# Patient Record
Sex: MALE | Race: WHITE | NOT HISPANIC OR LATINO | Employment: FULL TIME | URBAN - METROPOLITAN AREA
[De-identification: names, ages, dates, MRNs, and addresses within clinical notes are randomized per-mention and may not be internally consistent; named-entity substitution may affect disease eponyms.]

---

## 2017-06-26 ENCOUNTER — DOCUMENTATION ONLY (OUTPATIENT)
Dept: CARDIOLOGY | Facility: CLINIC | Age: 57
End: 2017-06-26

## 2017-08-09 ENCOUNTER — PRE VISIT (OUTPATIENT)
Dept: CARDIOLOGY | Facility: CLINIC | Age: 57
End: 2017-08-09

## 2017-08-15 ENCOUNTER — HOSPITAL ENCOUNTER (OUTPATIENT)
Dept: CARDIOLOGY | Facility: CLINIC | Age: 57
Discharge: HOME | End: 2017-08-15
Attending: INTERNAL MEDICINE | Admitting: INTERNAL MEDICINE
Payer: COMMERCIAL

## 2017-08-15 DIAGNOSIS — Q25.43 AORTIC ROOT ANEURYSM: ICD-10-CM

## 2017-08-15 DIAGNOSIS — I35.1 NONRHEUMATIC AORTIC VALVE INSUFFICIENCY: ICD-10-CM

## 2017-08-15 PROCEDURE — 93306 TTE W/DOPPLER COMPLETE: CPT | Mod: 26 | Performed by: INTERNAL MEDICINE

## 2017-08-15 PROCEDURE — 93306 TTE W/DOPPLER COMPLETE: CPT

## 2017-08-18 ENCOUNTER — OFFICE VISIT (OUTPATIENT)
Dept: CARDIOLOGY | Facility: CLINIC | Age: 57
End: 2017-08-18
Attending: INTERNAL MEDICINE
Payer: COMMERCIAL

## 2017-08-18 VITALS
WEIGHT: 150.3 LBS | HEIGHT: 67 IN | HEART RATE: 75 BPM | DIASTOLIC BLOOD PRESSURE: 78 MMHG | SYSTOLIC BLOOD PRESSURE: 126 MMHG | BODY MASS INDEX: 23.59 KG/M2

## 2017-08-18 DIAGNOSIS — Q25.43 AORTIC ROOT ANEURYSM: ICD-10-CM

## 2017-08-18 DIAGNOSIS — I35.1 NONRHEUMATIC AORTIC VALVE INSUFFICIENCY: ICD-10-CM

## 2017-08-18 PROCEDURE — 99214 OFFICE O/P EST MOD 30 MIN: CPT | Performed by: INTERNAL MEDICINE

## 2017-08-18 RX ORDER — LISINOPRIL 10 MG/1
10 TABLET ORAL DAILY
Qty: 90 TABLET | Refills: 3 | Status: SHIPPED | OUTPATIENT
Start: 2017-08-18 | End: 2018-10-15

## 2017-08-18 NOTE — LETTER
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  The patient is a 56-year-old male with a history of aortic root dilatation and mild to moderate aortic insufficiency.  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  We have been monitoring his echocardiograms annually.  His latest echocardiogram showed no change in the sinuses at 4.7 cm.  A CT scan in 2013 was performed and showed an aortic root of 4.6 cm when the echo measured it at 4.9 cm.  He has a well-preserved left ventricular size and function.  He was initially treated with atenolol since 1995.  We did an extensive literature search, which did not support the continued use of beta blockers to prevent worsening aortic insufficiency or aortic root aneurysm.  Instead we changed him to lisinopril.  The patient's echocardiogram on 08/15/2017 demonstrated mild aortic insufficiency, an ascending aorta of 3.7 cm and ejection fraction of 60%-65%.  The patient continues to exercise by riding his bike and jogging nearly every day.  He denies angina or shortness of breath.  He remains on a low-salt diet.      PHYSICAL EXAMINATION:  As listed below.     Outpatient Encounter Prescriptions as of 8/18/2017   Medication Sig Dispense Refill     lisinopril (PRINIVIL/ZESTRIL) 10 MG tablet Take 1 tablet (10 mg) by mouth daily 90 tablet 3     aspirin 81 MG tablet Take 81 mg by mouth daily       potassium citrate (UROCIT-K) 10 MEQ (1080 MG) SR tablet Take 10 mEq by mouth 3 times daily (with meals)       Multiple Vitamins-Minerals (MULTI COMPLETE PO) Take by mouth daily       [DISCONTINUED] lisinopril (PRINIVIL,ZESTRIL) 10 MG tablet Take 1 tablet (10 mg) by mouth daily 90 tablet 3      No facility-administered encounter medications on file as of 8/18/2017.         ASSESSMENT:   1.  Mykel Fishman is a delightful 56-year-old male with evidence of mild to moderate aortic insufficiency and aortic root and sinuses of Valsalva dilatation.  Etiology of the aortic root dilatation is not clear, but it certainly may be secondary to the aortic insufficiency or vice versa.  I cannot rule out fibromuscular dysplasia, although the patient does not have significant vascular disease otherwise.  We continue to treat with lisinopril and monitor his blood pressure and aorta.  He is a nonsmoker and he has an excellent exercise program.  We would consider surgery for aortic root dilatation when it reaches 5-5.5 cm.   2.  I have asked the patient to continue with his aerobic exercise and avoid heavy lifting.      PLAN:   1.  We will recheck an echocardiogram in 1 year and I will see him at that time.   2.  The patient will call for any chest discomfort or significant shortness of breath.      It is my pleasure to assist in the care of Mykel Fishman.  All his questions were answered to his satisfaction.      Sincerely,    Michael Whittaker MD     [facility]

## 2017-08-18 NOTE — PROGRESS NOTES
HISTORY OF PRESENT ILLNESS:  I again had the pleasure of seeing your patient, Mykel Fishman, at 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  The patient is a 56-year-old male with a history of aortic root dilatation and mild to moderate aortic insufficiency.  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  We have been monitoring his echocardiograms annually.  His latest echocardiogram showed no change in the sinuses at 4.7 cm.  A CT scan in 2013 was performed and showed an aortic root of 4.6 cm when the echo measured it at 4.9 cm.  He has a well-preserved left ventricular size and function.  He was initially treated with atenolol since 1995.  We did an extensive literature search, which did not support the continued use of beta blockers to prevent worsening aortic insufficiency or aortic root aneurysm.  Instead we changed him to lisinopril.  The patient's echocardiogram on 08/15/2017 demonstrated mild aortic insufficiency, an ascending aorta of 3.7 cm and ejection fraction of 60%-65%.  The patient continues to exercise by riding his bike and jogging nearly every day.  He denies angina or shortness of breath.  He remains on a low-salt diet.      PHYSICAL EXAMINATION:  As listed below.      ASSESSMENT:   1.  Mykel Fishman is a delightful 56-year-old male with evidence of mild to moderate aortic insufficiency and aortic root and sinuses of Valsalva dilatation.  Etiology of the aortic root dilatation is not clear, but it certainly may be secondary to the aortic insufficiency or vice versa.  I cannot rule out fibromuscular dysplasia, although the patient does not have significant vascular disease otherwise.  We continue to treat with lisinopril and monitor his blood pressure and aorta.  He is a nonsmoker and he has an excellent exercise program.  We would consider surgery for aortic root dilatation when it reaches 5-5.5 cm.   2.   I have asked the patient to continue with his aerobic exercise and avoid heavy lifting.      PLAN:   1.  We will recheck an echocardiogram in 1 year and I will see him at that time.   2.  The patient will call for any chest discomfort or significant shortness of breath.      It is my pleasure to assist in the care of Juancho Fishman.  All his questions were answered to his satisfaction.      cc:   Otto Ross MD   [facility]   [address] [place] [address]  [address]         JOSE ISSA MD, [facility]             D: 2017 12:28   T: 2017 12:53   MT: julien      Name:     JUANCHO FISHMAN   MRN:      [id number]        Account:      [id number]   :      1960           Service Date: 2017      Document: [id number]

## 2017-08-18 NOTE — MR AVS SNAPSHOT
After Visit Summary   8/18/2017    Mykel Fishman    MRN: [id number]           Patient Information     Date Of Birth          1960        Visit Information        Provider Department      8/18/2017 11:45 AM Michael Whittaker MD [facility]        Today's Diagnoses     Aortic root aneurysm (H)        Nonrheumatic aortic valve insufficiency           Follow-ups after your visit        Additional Services     Follow-Up with Cardiologist                 Future tests that were ordered for you today     Open Future Orders        Priority Expected Expires Ordered    Follow-Up with Cardiologist Routine 8/18/2018 8/19/2018 8/18/2017    Echocardiogram Routine 8/18/2018 8/19/2018 8/18/2017            Who to contact     If you have questions or need follow up information about today's clinic visit or your schedule please contact [facility] directly at [phone].  Normal or non-critical lab and imaging results will be communicated to you by [employer], letter or phone within 4 business days after the clinic has received the results. If you do not hear from us within 7 days, please contact the clinic through [employer] or phone. If you have a critical or abnormal lab result, we will notify you by phone as soon as possible.  Submit refill requests through [employer] or call your pharmacy and they will forward the refill request to us. Please allow 3 business days for your refill to be completed.          Additional Information About Your Visit        MyChart Information     [employer] gives you secure access to your electronic health record. If you see a primary care provider, you can also send messages to your care team and make appointments. If you have questions, please call your primary care clinic.  If you do not have a primary care provider, please call [phone] and they will assist you.        Care EveryWhere ID     This is  "your Care EveryWhere ID. This could be used by other organizations to access your [facility] medical records  [id number]        Your Vitals Were     Pulse Height BMI (Body Mass Index)             75 1.702 m (5' 7\") 23.54 kg/m2          Blood Pressure from Last 3 Encounters:   08/18/17 126/78   07/30/16 138/80   04/07/16 122/70    Weight from Last 3 Encounters:   08/18/17 68.2 kg (150 lb 4.8 oz)   07/30/16 69.9 kg (154 lb)   04/07/16 69.4 kg (153 lb)              We Performed the Following     Follow-Up with Cardiologist          Where to get your medicines      These medications were sent to [facility] [id number] [place], [place] [address] AT [facility] [place]  [address], [facility] [address]     Phone:  [phone]     lisinopril 10 MG tablet          Primary Care Provider Office Phone # Fax #    Otto Ross [phone]       [address] [facility] [address]        Equal Access to Services     CB BROCK AH: Hadii jesus vivas hadasho Soomaali, waaxda luqadaha, qaybta kaalmada adeegyada, magda amaya. So [facility] [phone].    ATENCIÓN: Si habla español, tiene a barclay disposición servicios gratuitos de asistencia lingüística. Llame al [phone].    We comply with applicable federal civil rights laws and [place] laws. We do not discriminate on the basis of race, color, national origin, age, disability sex, sexual orientation or gender identity.            Thank you!     Thank you for choosing [facility]  for your care. Our goal is always to provide you with excellent care. Hearing back from our patients is one way we can continue to improve our services. Please take a few minutes to complete the written survey that you may receive in the mail after your visit with us. Thank you!             Your Updated Medication List - Protect others around you: " Learn how to safely use, store and throw away your medicines at www.disposemymeds.org.          This list is accurate as of: 8/18/17 12:19 PM.  Always use your most recent med list.                   Brand Name Dispense Instructions for use Diagnosis    aspirin 81 MG tablet      Take 81 mg by mouth daily        lisinopril 10 MG tablet    PRINIVIL/ZESTRIL    90 tablet    Take 1 tablet (10 mg) by mouth daily    Nonrheumatic aortic valve insufficiency, Aortic root aneurysm (H)       MULTI COMPLETE PO      Take by mouth daily        potassium citrate 10 MEQ (1080 MG) CR tablet    UROCIT-K     Take 10 mEq by mouth 3 times daily (with meals)

## 2017-08-18 NOTE — PROGRESS NOTES
HPI and Plan:   See dictation:[id number]    Orders Placed This Encounter   Procedures     Follow-Up with Cardiologist     Echocardiogram       Orders Placed This Encounter   Medications     lisinopril (PRINIVIL/ZESTRIL) 10 MG tablet     Sig: Take 1 tablet (10 mg) by mouth daily     Dispense:  90 tablet     Refill:  3       Medications Discontinued During This Encounter   Medication Reason     lisinopril (PRINIVIL,ZESTRIL) 10 MG tablet Reorder         Encounter Diagnoses   Name Primary?     Aortic root aneurysm (H)      Nonrheumatic aortic valve insufficiency        CURRENT MEDICATIONS:  Current Outpatient Prescriptions   Medication Sig Dispense Refill     lisinopril (PRINIVIL/ZESTRIL) 10 MG tablet Take 1 tablet (10 mg) by mouth daily 90 tablet 3     aspirin 81 MG tablet Take 81 mg by mouth daily       potassium citrate (UROCIT-K) 10 MEQ (1080 MG) SR tablet Take 10 mEq by mouth 3 times daily (with meals)       Multiple Vitamins-Minerals (MULTI COMPLETE PO) Take by mouth daily       [DISCONTINUED] lisinopril (PRINIVIL,ZESTRIL) 10 MG tablet Take 1 tablet (10 mg) by mouth daily 90 tablet 3       ALLERGIES     Allergies   Allergen Reactions     No Clinical Screening - See Comments      NOVACAINE AND LOCAL ANSETHETICS     Penicillins        PAST MEDICAL HISTORY:  Past Medical History:   Diagnosis Date     Aortic regurgitation     +1-2 AR noted on 7/7/2014 Echo     Aortic root aneurysm (H)     4.3 cm noted on 7/7/2014 Echo     Elevated blood pressure      Family history of sudden death     Brother age 41     Kidney stones        PAST SURGICAL HISTORY:  History reviewed. No pertinent surgical history.    FAMILY HISTORY:  Family History   Problem Relation Age of Onset     Coronary Artery Disease Mother      Hypertension Mother      Heart Murmur Father      Coronary Artery Disease Brother        SOCIAL HISTORY:  Social History     Social History     Marital status:      Spouse name: N/A     Number of children: N/A      "Years of education: N/A     Social History Main Topics     Smoking status: Former Smoker     Smokeless tobacco: Never Used      Comment: quit in 2005      Alcohol use Yes      Comment: occasional     Drug use: No     Sexual activity: Not Asked     Other Topics Concern     Caffeine Concern No     2 cups coffee     Exercise Yes     daily, videos     Seat Belt Yes     Social History Narrative       Review of Systems:  Skin:  Negative       Eyes:  Positive for glasses    ENT:  Negative      Respiratory:  Negative       Cardiovascular:  Negative      Gastroenterology: Negative      Genitourinary:  not assessed      Musculoskeletal:  Negative      Neurologic:  Negative      Psychiatric:  Negative      Heme/Lymph/Imm:  Positive for allergies    Endocrine:  Negative        Physical Exam:  Vitals: /78  Pulse 75  Ht 1.702 m (5' 7\")  Wt 68.2 kg (150 lb 4.8 oz)  BMI 23.54 kg/m2    Constitutional:  cooperative, alert and oriented, well developed, well nourished, in no acute distress        Skin:  warm and dry to the touch, no apparent skin lesions or masses noted        Head:  normocephalic, no masses or lesions        Eyes:  pupils equal and round, conjunctivae and lids unremarkable, sclera white, no xanthalasma, EOMS intact, no nystagmus        ENT:  no pallor or cyanosis, dentition good        Neck:  carotid pulses are full and equal bilaterally, JVP normal, no carotid bruit, no thyromegaly        Chest:  normal breath sounds, clear to auscultation, normal A-P diameter, normal symmetry, normal respiratory excursion, no use of accessory muscles          Cardiac: regular rhythm;normal S1 and S2;no S3 or S4;apical impulse not displaced           early diastolic murmur;grade 1;RUSB      Abdomen:  abdomen soft, non-tender, BS normoactive, no mass, no HSM, no bruits        Vascular: pulses full and equal, no bruits auscultated                                        Extremities and Back:  no deformities, clubbing, " cyanosis, erythema observed;no edema              Neurological:  affect appropriate, oriented to time, person and place;no gross motor deficits              CC  Michael Whittaker MD  [address]  [place] [address]

## 2018-09-25 DIAGNOSIS — I35.1 AORTIC REGURGITATION: Primary | ICD-10-CM

## 2018-09-25 NOTE — PROGRESS NOTES
Call from patient stating that he missed his echo in August and needs to reschedule prior to his OV with Dr. Whittaker in October, but the order is . Echocardiogram order for patient is . New order placed. Contacted scheduling to have them set patient up for echocardiogram prior to OV with Dr. Whittaker.

## 2018-10-02 ENCOUNTER — HOSPITAL ENCOUNTER (OUTPATIENT)
Dept: CARDIOLOGY | Facility: CLINIC | Age: 58
End: 2018-10-02
Attending: INTERNAL MEDICINE
Payer: COMMERCIAL

## 2018-10-02 DIAGNOSIS — I35.1 AORTIC REGURGITATION: ICD-10-CM

## 2018-10-02 PROCEDURE — 93306 TTE W/DOPPLER COMPLETE: CPT | Mod: 26 | Performed by: INTERNAL MEDICINE

## 2018-10-15 ENCOUNTER — OFFICE VISIT (OUTPATIENT)
Dept: CARDIOLOGY | Facility: CLINIC | Age: 58
End: 2018-10-15
Payer: COMMERCIAL

## 2018-10-15 VITALS
HEART RATE: 80 BPM | WEIGHT: 162 LBS | SYSTOLIC BLOOD PRESSURE: 132 MMHG | DIASTOLIC BLOOD PRESSURE: 78 MMHG | BODY MASS INDEX: 25.43 KG/M2 | HEIGHT: 67 IN

## 2018-10-15 DIAGNOSIS — K51.00 ULCERATIVE PANCOLITIS WITHOUT COMPLICATION (H): ICD-10-CM

## 2018-10-15 DIAGNOSIS — Q25.43 AORTIC ROOT ANEURYSM: Primary | ICD-10-CM

## 2018-10-15 DIAGNOSIS — I35.1 NONRHEUMATIC AORTIC VALVE INSUFFICIENCY: ICD-10-CM

## 2018-10-15 PROCEDURE — 99214 OFFICE O/P EST MOD 30 MIN: CPT | Performed by: INTERNAL MEDICINE

## 2018-10-15 RX ORDER — ATENOLOL 25 MG/1
50 TABLET ORAL DAILY
Qty: 90 TABLET | Refills: 3 | Status: SHIPPED | OUTPATIENT
Start: 2018-10-15 | End: 2018-10-17

## 2018-10-15 RX ORDER — MESALAMINE 800 MG/1
800 TABLET, DELAYED RELEASE ORAL 3 TIMES DAILY
Qty: 270 TABLET | Refills: 3 | COMMUNITY
Start: 2018-10-15 | End: 2021-02-02

## 2018-10-15 NOTE — LETTER
10/15/2018      Otto Ross MD  [email or web address]   [address]  [facility] [address]      RE: Mykel Fishman       Dear Colleague,    I had the pleasure of seeing Mykel Fishman in the [facility].    Service Date: 10/15/2018      PRIMARY CARE PHYSICIAN:  Dr. 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  The patient is a 58-year-old male with a history of aortic root dilatation and mild to moderate aortic insufficiency.  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  We have been monitoring his echocardiograms annually.  An echocardiogram performed on 10/02 and compared to a previous echocardiogram of 08/15/2017 continued to demonstrate an aortic root of 4.7 cm and an ascending aorta of 3.6 cm.  The degree of aortic insufficiency has been trace to mild.  Left ventricular ejection fraction is well preserved and there is no left ventricular enlargement.  Initially the patient was treated with atenolol since 1995.  Extensive literature search previously did not indicate beta blockers were preferred and he was changed lisinopril.  More recently literature has again noted beta blockers should be used for aortic aneurysms and I have changed him back to atenolol 50 mg per day.  The patient continues to exercise by riding his bike and walking 3 times a week for each for 1 hour.  He denies angina or shortness of breath.  He remains on a low-salt diet.  He has developed ulcerative colitis.  He denies hospitalizations or surgery in the last year.  He does some video aerobics as well.      PHYSICAL EXAMINATION:  As listed below.             ASSESSMENT:   1.  Mykel Fishman is a delightful 58-year-old male with evidence of mild aortic insufficiency and aortic root and sinuses of  Valsalva dilatation.  Etiology of the aortic root dilatation is not clear but certainly may be secondary to the aortic insufficiency or vice versa.  I cannot rule out fibromuscular dysplasia although the patient does not have significant vascular disease otherwise.  We have changed his lisinopril back to a beta blocker.  He is a nonsmoker and has an excellent exercise program.  We would consider surgery for aortic root dilatation when it reaches 5 to 5.5 cm.   2.  I have asked the patient to continue with his aerobic exercise and avoid heavy lifting.   3.  I note that this patient has had his cholesterol done every year without change.  His lipid levels have remained constant.  The current recommendation is for lipids to be performed every 5 years if the patient does not have coronary artery disease and is not placed on a statin.      PLAN:   1.  We will recheck an echocardiogram in 1 year and I will see him at that time.   2.  I have given him a new prescription for atenolol 50 mg daily.  We will discontinue the lisinopril.   3.  The patient will call for any chest discomfort or significant shortness of breath.      It is my pleasure to assist in the care of Juancho Rios.  All his questions were answered to his satisfaction.      Jose Issa MD      cc:   Otto Ross MD    [place] [phone]         JOSE ISSA MD, [facility]             D: 10/15/2018   T: 10/15/2018   MT: JULIAN      Name:     JUANCHO RIOS   MRN:      [id number]        Account:      [id number]   :      1960           Service Date: 10/15/2018      Document: [id number]           Outpatient Encounter Prescriptions as of 10/15/2018   Medication Sig Dispense Refill     mesalamine (ASACOL HD) 800 MG EC tablet Take 1 tablet (800 mg) by mouth 3 times daily 270 tablet 3     Multiple Vitamins-Minerals (MULTI COMPLETE PO) Take by mouth daily       potassium citrate  (UROCIT-K) 10 MEQ (1080 MG) SR tablet Take 10 mEq by mouth 2 times daily        [DISCONTINUED] atenolol (TENORMIN) 25 MG tablet Take 2 tablets (50 mg) by mouth daily 90 tablet 3     [DISCONTINUED] aspirin 81 MG tablet Take 81 mg by mouth daily       [DISCONTINUED] lisinopril (PRINIVIL/ZESTRIL) 10 MG tablet Take 1 tablet (10 mg) by mouth daily 90 tablet 3     No facility-administered encounter medications on file as of 10/15/2018.        Again, thank you for allowing me to participate in the care of your patient.      Sincerely,    Michael Whittaker MD     [facility]

## 2018-10-15 NOTE — MR AVS SNAPSHOT
After Visit Summary   10/15/2018    Mykel Fishman    MRN: [id number]           Patient Information     Date Of Birth          1960        Visit Information        Provider Department      10/15/2018 3:15 PM Michael Whittaker MD [facility]        Today's Diagnoses     Aortic root aneurysm (H)    -  1    Nonrheumatic aortic valve insufficiency        Ulcerative pancolitis without complication (H)           Follow-ups after your visit        Additional Services     Follow-Up with Cardiologist                 Future tests that were ordered for you today     Open Future Orders        Priority Expected Expires Ordered    Follow-Up with Cardiologist Routine 10/15/2019 10/16/2019 10/15/2018    Echocardiogram Routine 10/15/2019 10/16/2019 10/15/2018            Who to contact     If you have questions or need follow up information about today's clinic visit or your schedule please contact [facility] directly at [phone].  Normal or non-critical lab and imaging results will be communicated to you by [email or web address], letter or phone within 4 business days after the clinic has received the results. If you do not hear from us within 7 days, please contact the clinic through [employer] or phone. If you have a critical or abnormal lab result, we will notify you by phone as soon as possible.  Submit refill requests through [employer] or call your pharmacy and they will forward the refill request to us. Please allow 3 business days for your refill to be completed.          Additional Information About Your Visit        MyChart Information     [employer] gives you secure access to your electronic health record. If you see a primary care provider, you can also send messages to your care team and make appointments. If you have questions, please call your primary care clinic.  If you do not have a primary care provider, please call [phone] and they  "will assist you.        Care EveryWhere ID     This is your Care EveryWhere ID. This could be used by other organizations to access your [facility] [place] medical records  [id number]        Your Vitals Were     Pulse Height BMI (Body Mass Index)             80 1.702 m (5' 7\") 25.37 kg/m2          Blood Pressure from Last 3 Encounters:   10/15/18 132/78   08/18/17 126/78   07/30/16 138/80    Weight from Last 3 Encounters:   10/15/18 73.5 kg (162 lb)   08/18/17 68.2 kg (150 lb 4.8 oz)   07/30/16 69.9 kg (154 lb)                 Today's Medication Changes          These changes are accurate as of 10/15/18  3:56 PM.  If you have any questions, ask your nurse or doctor.               Start taking these medicines.        Dose/Directions    atenolol 25 MG tablet   Commonly known as:  TENORMIN   Used for:  Aortic root aneurysm (H)   Started by:  Michael Whittaker MD        Dose:  50 mg   Take 2 tablets (50 mg) by mouth daily   Quantity:  90 tablet   Refills:  3         Stop taking these medicines if you haven't already. Please contact your care team if you have questions.     aspirin 81 MG tablet   Stopped by:  Michael Whittaker MD           lisinopril 10 MG tablet   Commonly known as:  PRINIVIL/ZESTRIL   Stopped by:  Michael Whittaker MD                Where to get your medicines      These medications were sent to [facility] [id number] [place] [address] [address] AT [facility] [address]  [address], [facility] [address]     Phone:  [phone]     atenolol 25 MG tablet                Primary Care Provider Office Phone # Fax #    Otto Ross [phone]       [employer] [address] [facility] [address]        Equal Access to Services     [facility] VINOD : Casey arriaga Soryley, waaxda luqadaha, qaybta kaalmada adeegyagin, magda medina . [facility] [phone].    ATENCIÓN: Si christian lu, tiene a barclay disposición " servicios gratuitos de asistencia lingüística. Merlene denney [phone].    We comply with applicable federal civil rights laws and [place] laws. We do not discriminate on the basis of race, color, national origin, age, disability, sex, sexual orientation, or gender identity.            Thank you!     Thank you for choosing [facility]  for your care. Our goal is always to provide you with excellent care. Hearing back from our patients is one way we can continue to improve our services. Please take a few minutes to complete the written survey that you may receive in the mail after your visit with us. Thank you!             Your Updated Medication List - Protect others around you: Learn how to safely use, store and throw away your medicines at www.disposemymeds.org.          This list is accurate as of 10/15/18  3:56 PM.  Always use your most recent med list.                   Brand Name Dispense Instructions for use Diagnosis    atenolol 25 MG tablet    TENORMIN    90 tablet    Take 2 tablets (50 mg) by mouth daily    Aortic root aneurysm (H)       mesalamine 800 MG EC tablet    ASACOL HD    270 tablet    Take 1 tablet (800 mg) by mouth 3 times daily    Ulcerative pancolitis without complication (H)       MULTI COMPLETE PO      Take by mouth daily        potassium citrate 10 MEQ (1080 MG) CR tablet    UROCIT-K     Take 10 mEq by mouth 2 times daily

## 2018-10-15 NOTE — LETTER
10/15/2018    Otto Ross MD  [employer] [address]  [facility] [address]    RE: Mykel Fishman       Dear Colleague,    I had the pleasure of seeing Mykel Fishman in the [facility].    HPI and Plan:   See dictation:[id number]    Orders Placed This Encounter   Procedures     Follow-Up with Cardiologist     Echocardiogram       Orders Placed This Encounter   Medications     atenolol (TENORMIN) 25 MG tablet     Sig: Take 2 tablets (50 mg) by mouth daily     Dispense:  90 tablet     Refill:  3     mesalamine (ASACOL HD) 800 MG EC tablet     Sig: Take 1 tablet (800 mg) by mouth 3 times daily     Dispense:  270 tablet     Refill:  3       Medications Discontinued During This Encounter   Medication Reason     aspirin 81 MG tablet      lisinopril (PRINIVIL/ZESTRIL) 10 MG tablet          Encounter Diagnoses   Name Primary?     Aortic root aneurysm (H) Yes     Nonrheumatic aortic valve insufficiency      Ulcerative pancolitis without complication (H)        CURRENT MEDICATIONS:  Current Outpatient Prescriptions   Medication Sig Dispense Refill     atenolol (TENORMIN) 25 MG tablet Take 2 tablets (50 mg) by mouth daily 90 tablet 3     mesalamine (ASACOL HD) 800 MG EC tablet Take 1 tablet (800 mg) by mouth 3 times daily 270 tablet 3     Multiple Vitamins-Minerals (MULTI COMPLETE PO) Take by mouth daily       potassium citrate (UROCIT-K) 10 MEQ (1080 MG) SR tablet Take 10 mEq by mouth 2 times daily          ALLERGIES     Allergies   Allergen Reactions     No Clinical Screening - See Comments      NOVACAINE AND LOCAL ANSETHETICS     Penicillins        PAST MEDICAL HISTORY:  Past Medical History:   Diagnosis Date     Aortic regurgitation     +1-2 AR noted on 7/7/2014 Echo     Aortic root aneurysm (H)     4.3 cm noted on 7/7/2014 Echo     Elevated blood pressure      Family history of sudden death     Brother age 41     Kidney stones        PAST SURGICAL HISTORY:  History reviewed. No  "pertinent surgical history.    FAMILY HISTORY:  Family History   Problem Relation Age of Onset     Coronary Artery Disease Mother      Hypertension Mother      Heart Murmur Father      Coronary Artery Disease Brother        SOCIAL HISTORY:  Social History     Social History     Marital status:      Spouse name: N/A     Number of children: N/A     Years of education: N/A     Social History Main Topics     Smoking status: Former Smoker     Packs/day: 1.00     Start date: 1990     Quit date: 2005     Smokeless tobacco: Never Used      Comment: quit in 2005      Alcohol use Yes      Comment: occasional     Drug use: No     Sexual activity: Not Asked     Other Topics Concern     Caffeine Concern No     2 cups coffee     Exercise Yes     daily, videos     Seat Belt Yes     Social History Narrative       Review of Systems:  Skin:  Negative       Eyes:  Positive for glasses    ENT:  Negative      Respiratory:  Negative       Cardiovascular:  Negative      Gastroenterology: Positive for  (colitis)    Genitourinary:  not assessed      Musculoskeletal:  Negative      Neurologic:  Negative      Psychiatric:  Negative      Heme/Lymph/Imm:  Positive for allergies    Endocrine:  Negative        Physical Exam:  Vitals: /78  Pulse 80  Ht 1.702 m (5' 7\")  Wt 73.5 kg (162 lb)  BMI 25.37 kg/m2    Constitutional:  cooperative, alert and oriented, well developed, well nourished, in no acute distress        Skin:  warm and dry to the touch, no apparent skin lesions or masses noted          Head:  normocephalic, no masses or lesions        Eyes:  pupils equal and round;conjunctivae and lids unremarkable;sclera white;no xanthalasma;EOMS intact        Lymph:      ENT:  no pallor or cyanosis, dentition good        Neck:  carotid pulses are full and equal bilaterally, JVP normal, no carotid bruit        Respiratory:  normal breath sounds, clear to auscultation, normal A-P diameter, normal symmetry, normal respiratory " excursion, no use of accessory muscles         Cardiac: regular rhythm;normal S1 and S2;no S3 or S4;apical impulse not displaced           early diastolic murmur;grade 1;RUSB    pulses full and equal, no bruits auscultated                                        GI:  abdomen soft, non-tender, BS normoactive, no mass, no HSM, no bruits        Extremities and Muscular Skeletal:  no deformities, clubbing, cyanosis, erythema observed;no edema              Neurological:  no gross motor deficits;affect appropriate        Psych:  Alert and Oriented x 3        CC  No referring provider defined for this encounter.                Thank you for allowing me to participate in the care of your patient.      Sincerely,     Michael Whittaker MD     [facility]    cc:   No referring provider defined for this encounter.

## 2018-10-15 NOTE — PROGRESS NOTES
HPI and Plan:   See dictation:[id number]    Orders Placed This Encounter   Procedures     Follow-Up with Cardiologist     Echocardiogram       Orders Placed This Encounter   Medications     atenolol (TENORMIN) 25 MG tablet     Sig: Take 2 tablets (50 mg) by mouth daily     Dispense:  90 tablet     Refill:  3     mesalamine (ASACOL HD) 800 MG EC tablet     Sig: Take 1 tablet (800 mg) by mouth 3 times daily     Dispense:  270 tablet     Refill:  3       Medications Discontinued During This Encounter   Medication Reason     aspirin 81 MG tablet      lisinopril (PRINIVIL/ZESTRIL) 10 MG tablet          Encounter Diagnoses   Name Primary?     Aortic root aneurysm (H) Yes     Nonrheumatic aortic valve insufficiency      Ulcerative pancolitis without complication (H)        CURRENT MEDICATIONS:  Current Outpatient Prescriptions   Medication Sig Dispense Refill     atenolol (TENORMIN) 25 MG tablet Take 2 tablets (50 mg) by mouth daily 90 tablet 3     mesalamine (ASACOL HD) 800 MG EC tablet Take 1 tablet (800 mg) by mouth 3 times daily 270 tablet 3     Multiple Vitamins-Minerals (MULTI COMPLETE PO) Take by mouth daily       potassium citrate (UROCIT-K) 10 MEQ (1080 MG) SR tablet Take 10 mEq by mouth 2 times daily          ALLERGIES     Allergies   Allergen Reactions     No Clinical Screening - See Comments      NOVACAINE AND LOCAL ANSETHETICS     Penicillins        PAST MEDICAL HISTORY:  Past Medical History:   Diagnosis Date     Aortic regurgitation     +1-2 AR noted on 7/7/2014 Echo     Aortic root aneurysm (H)     4.3 cm noted on 7/7/2014 Echo     Elevated blood pressure      Family history of sudden death     Brother age 41     Kidney stones        PAST SURGICAL HISTORY:  History reviewed. No pertinent surgical history.    FAMILY HISTORY:  Family History   Problem Relation Age of Onset     Coronary Artery Disease Mother      Hypertension Mother      Heart Murmur Father      Coronary Artery Disease Brother        SOCIAL  "HISTORY:  Social History     Social History     Marital status:      Spouse name: N/A     Number of children: N/A     Years of education: N/A     Social History Main Topics     Smoking status: Former Smoker     Packs/day: 1.00     Start date: 1990     Quit date: 2005     Smokeless tobacco: Never Used      Comment: quit in 2005      Alcohol use Yes      Comment: occasional     Drug use: No     Sexual activity: Not Asked     Other Topics Concern     Caffeine Concern No     2 cups coffee     Exercise Yes     daily, videos     Seat Belt Yes     Social History Narrative       Review of Systems:  Skin:  Negative       Eyes:  Positive for glasses    ENT:  Negative      Respiratory:  Negative       Cardiovascular:  Negative      Gastroenterology: Positive for  (colitis)    Genitourinary:  not assessed      Musculoskeletal:  Negative      Neurologic:  Negative      Psychiatric:  Negative      Heme/Lymph/Imm:  Positive for allergies    Endocrine:  Negative        Physical Exam:  Vitals: /78  Pulse 80  Ht 1.702 m (5' 7\")  Wt 73.5 kg (162 lb)  BMI 25.37 kg/m2    Constitutional:  cooperative, alert and oriented, well developed, well nourished, in no acute distress        Skin:  warm and dry to the touch, no apparent skin lesions or masses noted          Head:  normocephalic, no masses or lesions        Eyes:  pupils equal and round;conjunctivae and lids unremarkable;sclera white;no xanthalasma;EOMS intact        Lymph:      ENT:  no pallor or cyanosis, dentition good        Neck:  carotid pulses are full and equal bilaterally, JVP normal, no carotid bruit        Respiratory:  normal breath sounds, clear to auscultation, normal A-P diameter, normal symmetry, normal respiratory excursion, no use of accessory muscles         Cardiac: regular rhythm;normal S1 and S2;no S3 or S4;apical impulse not displaced           early diastolic murmur;grade 1;RUSB    pulses full and equal, no bruits auscultated                   "                      GI:  abdomen soft, non-tender, BS normoactive, no mass, no HSM, no bruits        Extremities and Muscular Skeletal:  no deformities, clubbing, cyanosis, erythema observed;no edema              Neurological:  no gross motor deficits;affect appropriate        Psych:  Alert and Oriented x 3        CC  No referring provider defined for this encounter.

## 2018-10-16 NOTE — PROGRESS NOTES
Service Date: 10/15/2018      PRIMARY CARE PHYSICIAN:  Dr. Otto Ross        HISTORY OF PRESENT ILLNESS:  I again had the pleasure of seeing your patient, Mykel Fishman, at [facility] for evaluation of aortic root aneurysm as well as aortic insufficiency.  The patient is a 58-year-old male with a history of aortic root dilatation and mild to moderate aortic insufficiency.  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  We have been monitoring his echocardiograms annually.  An echocardiogram performed on 10/02 and compared to a previous echocardiogram of 08/15/2017 continued to demonstrate an aortic root of 4.7 cm and an ascending aorta of 3.6 cm.  The degree of aortic insufficiency has been trace to mild.  Left ventricular ejection fraction is well preserved and there is no left ventricular enlargement.  Initially the patient was treated with atenolol since 1995.  Extensive literature search previously did not indicate beta blockers were preferred and he was changed lisinopril.  More recently literature has again noted beta blockers should be used for aortic aneurysms and I have changed him back to atenolol 50 mg per day.  The patient continues to exercise by riding his bike and walking 3 times a week for each for 1 hour.  He denies angina or shortness of breath.  He remains on a low-salt diet.  He has developed ulcerative colitis.  He denies hospitalizations or surgery in the last year.  He does some video aerobics as well.      PHYSICAL EXAMINATION:  As listed below.         ASSESSMENT:   1.  Mykel Fishman is a delightful 58-year-old male with evidence of mild aortic insufficiency and aortic root and sinuses of Valsalva dilatation.  Etiology of the aortic root dilatation is not clear but certainly may be secondary to the aortic insufficiency or vice versa.  I cannot rule out fibromuscular dysplasia although the patient does not have significant vascular  disease otherwise.  We have changed his lisinopril back to a beta blocker.  He is a nonsmoker and has an excellent exercise program.  We would consider surgery for aortic root dilatation when it reaches 5 to 5.5 cm.   2.  I have asked the patient to continue with his aerobic exercise and avoid heavy lifting.   3.  I note that this patient has had his cholesterol done every year without change.  His lipid levels have remained constant.  The current recommendation is for lipids to be performed every 5 years if the patient does not have coronary artery disease and is not placed on a statin.      PLAN:   1.  We will recheck an echocardiogram in 1 year and I will see him at that time.   2.  I have given him a new prescription for atenolol 50 mg daily.  We will discontinue the lisinopril.   3.  The patient will call for any chest discomfort or significant shortness of breath.      It is my pleasure to assist in the care of Juancho Rios.  All his questions were answered to his satisfaction.      Jose Issa MD      cc:   Otto Ross MD    [place] [phone]         JOSE ISSA MD, [facility]             D: 10/15/2018   T: 10/15/2018   MT: JULIAN      Name:     JUANCHO RIOS   MRN:      [id number]        Account:      [id number]   :      1960           Service Date: 10/15/2018      Document: [id number]

## 2018-10-17 DIAGNOSIS — Q25.43 AORTIC ROOT ANEURYSM: ICD-10-CM

## 2018-10-17 RX ORDER — ATENOLOL 50 MG/1
50 TABLET ORAL DAILY
Qty: 90 TABLET | Refills: 3 | Status: SHIPPED | OUTPATIENT
Start: 2018-10-17 | End: 2019-11-25

## 2019-07-13 ENCOUNTER — OFFICE VISIT (OUTPATIENT)
Dept: URGENT CARE | Facility: URGENT CARE | Age: 59
End: 2019-07-13
Payer: COMMERCIAL

## 2019-07-13 VITALS
BODY MASS INDEX: 24.43 KG/M2 | SYSTOLIC BLOOD PRESSURE: 132 MMHG | DIASTOLIC BLOOD PRESSURE: 70 MMHG | RESPIRATION RATE: 16 BRPM | WEIGHT: 156 LBS | HEART RATE: 70 BPM | TEMPERATURE: 98.9 F

## 2019-07-13 DIAGNOSIS — B34.9 VIRAL SYNDROME: Primary | ICD-10-CM

## 2019-07-13 DIAGNOSIS — B02.9 HERPES ZOSTER WITHOUT COMPLICATION: ICD-10-CM

## 2019-07-13 PROCEDURE — 99214 OFFICE O/P EST MOD 30 MIN: CPT | Performed by: PHYSICIAN ASSISTANT

## 2019-07-13 RX ORDER — VALACYCLOVIR HYDROCHLORIDE 1 G/1
1000 TABLET, FILM COATED ORAL 3 TIMES DAILY
Qty: 21 TABLET | Refills: 0 | Status: SHIPPED | OUTPATIENT
Start: 2019-07-13 | End: 2021-02-02

## 2019-07-13 RX ORDER — SULFASALAZINE 500 MG/1
1500 TABLET, DELAYED RELEASE ORAL 2 TIMES DAILY
Refills: 3 | COMMUNITY
Start: 2019-05-02

## 2019-07-13 RX ORDER — PREDNISONE 2.5 MG/1
2.5 TABLET ORAL
Refills: 0 | COMMUNITY
Start: 2019-06-06 | End: 2022-07-03

## 2019-07-13 RX ORDER — FOLIC ACID 1 MG/1
TABLET ORAL
Refills: 10 | COMMUNITY
Start: 2019-06-21

## 2019-07-16 NOTE — PROGRESS NOTES
SUBJECTIVE:  Mykel Fishman is a 58 year old male who presents to the clinic today for a rash.  Onset of rash was 3 day(s) ago.   Rash is still present.  Location of the rash: right lower leg, foot and leg.  Quality/symptoms of rash: red   Symptoms are mild and moderate and rash seems to be stable.  Previous history of a similar rash? No  Recent exposure history: none  Recent new medications: none  Associated symptoms include: erythema, macular rash.    Past Medical History:   Diagnosis Date     Aortic regurgitation     +1-2 AR noted on 2014 Echo     Aortic root aneurysm (H)     4.3 cm noted on 2014 Echo     Elevated blood pressure      Family history of sudden death     Brother age 41     Kidney stones         Allergies   Allergen Reactions     No Clinical Screening - See Comments      NOVACAINE AND LOCAL ANSETHETICS     Penicillins      Social History     Tobacco Use     Smoking status: Former Smoker     Packs/day: 1.00     Start date:      Last attempt to quit:      Years since quittin.5     Smokeless tobacco: Never Used     Tobacco comment: quit in     Substance Use Topics     Alcohol use: Yes     Comment: occasional     Family History   Problem Relation Age of Onset     Coronary Artery Disease Mother      Hypertension Mother      Heart Murmur Father      Coronary Artery Disease Brother        ROS:  CONSTITUTIONAL:NEGATIVE for fever, chills, change in weight  INTEGUMENTARY/SKIN: POSITIVE for rash on foot, lower leg  ENT/MOUTH: NEGATIVE for ear, mouth and throat problems  RESP:NEGATIVE for significant cough or SOB  CV: NEGATIVE for chest pain, palpitations or peripheral edema  GI: NEGATIVE for nausea, abdominal pain, heartburn, or change in bowel habits  MUSCULOSKELETAL: POSITIVE for right foot rash  NEURO: NEGATIVE for weakness, dizziness or paresthesias    EXAM:   /70 (Cuff Size: Adult Regular)   Pulse 70   Temp 98.9  F (37.2  C) (Oral)   Resp 16   Wt 70.8 kg (156 lb)   BMI  24.43 kg/m    GENERAL: alert, no acute distress.  SKIN: Rash description:    Distribution: localized  Location: right lower leg and foot    Color: erythema,  Lesion type: macular, isolated with inflammation  GENERAL APPEARANCE: healthy, alert and no distress  EYES: EOMI,  PERRL, conjunctiva clear  NECK: supple, non-tender to palpation, no adenopathy noted  RESP: lungs clear to auscultation - no rales, rhonchi or wheezes  CV: regular rates and rhythm, normal S1 S2, no murmur noted  NEURO: Normal strength and tone, sensory exam grossly normal,  normal speech and mentation    ASSESSMENT/PLAN      ICD-10-CM    1. Viral syndrome B34.9    2. Herpes zoster without complication B02.9 valACYclovir (VALTREX) 1000 mg tablet         valACYclovir (VALTREX) 1000 mg tablet       1) See today's orders.  2) Follow-up with primary clinic if not improving

## 2019-11-25 DIAGNOSIS — Q25.43 AORTIC ROOT ANEURYSM: ICD-10-CM

## 2019-11-25 RX ORDER — ATENOLOL 50 MG/1
50 TABLET ORAL DAILY
Qty: 90 TABLET | Refills: 0 | Status: SHIPPED | OUTPATIENT
Start: 2019-11-25 | End: 2020-01-06

## 2019-12-15 ENCOUNTER — HEALTH MAINTENANCE LETTER (OUTPATIENT)
Age: 59
End: 2019-12-15

## 2020-01-02 PROBLEM — M19.90 INFLAMMATORY ARTHRITIS: Status: ACTIVE | Noted: 2020-01-02

## 2020-01-03 ENCOUNTER — HOSPITAL ENCOUNTER (OUTPATIENT)
Dept: CARDIOLOGY | Facility: CLINIC | Age: 60
Discharge: HOME | End: 2020-01-03
Attending: INTERNAL MEDICINE | Admitting: INTERNAL MEDICINE
Payer: COMMERCIAL

## 2020-01-03 DIAGNOSIS — Q25.43 AORTIC ROOT ANEURYSM: ICD-10-CM

## 2020-01-03 DIAGNOSIS — I35.1 NONRHEUMATIC AORTIC VALVE INSUFFICIENCY: ICD-10-CM

## 2020-01-03 PROCEDURE — 93306 TTE W/DOPPLER COMPLETE: CPT | Mod: 26 | Performed by: INTERNAL MEDICINE

## 2020-01-03 PROCEDURE — 93306 TTE W/DOPPLER COMPLETE: CPT

## 2020-01-06 ENCOUNTER — OFFICE VISIT (OUTPATIENT)
Dept: CARDIOLOGY | Facility: CLINIC | Age: 60
End: 2020-01-06
Payer: COMMERCIAL

## 2020-01-06 VITALS
BODY MASS INDEX: 25.9 KG/M2 | HEART RATE: 62 BPM | DIASTOLIC BLOOD PRESSURE: 72 MMHG | HEIGHT: 67 IN | SYSTOLIC BLOOD PRESSURE: 130 MMHG | WEIGHT: 165 LBS

## 2020-01-06 DIAGNOSIS — K51.00 ULCERATIVE PANCOLITIS WITHOUT COMPLICATION (H): ICD-10-CM

## 2020-01-06 DIAGNOSIS — M19.90 INFLAMMATORY ARTHRITIS: ICD-10-CM

## 2020-01-06 DIAGNOSIS — Q25.43 AORTIC ROOT ANEURYSM: Primary | ICD-10-CM

## 2020-01-06 DIAGNOSIS — I35.1 NONRHEUMATIC AORTIC VALVE INSUFFICIENCY: ICD-10-CM

## 2020-01-06 PROCEDURE — 99214 OFFICE O/P EST MOD 30 MIN: CPT | Performed by: INTERNAL MEDICINE

## 2020-01-06 RX ORDER — ATENOLOL 50 MG/1
50 TABLET ORAL DAILY
Qty: 90 TABLET | Refills: 3 | Status: SHIPPED | OUTPATIENT
Start: 2020-01-06 | End: 2021-02-02

## 2020-01-06 ASSESSMENT — MIFFLIN-ST. JEOR: SCORE: 1527.07

## 2020-01-06 NOTE — LETTER
1/6/2020      Otto Ross MD  [facility] [address]  [facility] [address]      RE: Mykel Fishman       Dear Colleague,    I had the pleasure of seeing Mykel Fishman in the [facility].    Service Date: 01/06/2020      PRIMARY CARE PHYSICIAN:  Dr. Otto Ross.      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  The patient is a 59-year-old male with a history of aortic root dilatation and mild to moderate aortic insufficiency.  He has dilated sinuses of Valsalva measuring approximately 4.7 cm with aortic root of 3.6-3.7 cm more recently.  His last echocardiogram was performed on 01/03.  There was mild concentric left ventricular hypertrophy at the same time.  The aorta has not significantly changed from previous.  The amount of aortic insufficiency is felt to be mild to moderate.  It is important that this patient has been diagnosed with irritable bowel disease along with arthritis.  He has been placed on methotrexate as well as prednisone for this.  It is not uncommon to see aortic root disease as well as aortic insufficiency with irritable bowel disease.  He his left ventricular ejection fraction has been well preserved.  We have treated him with atenolol since 1995.  Literature search had indicated that beta blockers should be used for aortic aneurysms.  The patient's exercise currently is some walking but he is riding his bike less frequent.  He denies angina or shortness of breath.  He denies a history of hypertension.  He remains on a low-salt diet.  He is followed by his GI specialist for his ulcerative colitis and a rheumatologist for his rheumatologic issues.  He denies hospitalizations or surgery since I saw him 1 year ago.  He developed shingles on his leg in July of this year.      PHYSICAL  EXAMINATION:  As listed below.  I do not hear any murmur of aortic insufficiency.      ASSESSMENT:   1.  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  Certainly etiology of the aortic root dilatation and the aortic insufficiency may be due to the patient's ulcerative colitis and irritable bowel disease.  Fibromuscular dysplasia is less likely.  The patient remains on a beta blocker.  He is a nonsmoker.  I have suggested that he continue with an exercise program.  He has gained some weight.  We would certainly consider surgery for aortic root dilatation if it exceeds 5 cm.  A CT scan would be necessary prior to that.   2.  The patient's cholesterol is being followed by his primary care physician.  I reviewed his last lab results from 10/01/2018 with total cholesterol 152, triglycerides 91, HDL 51 and LDL 83.      It is my pleasure to assist in the care of Juancho Rios.  We will assess his aorta and aortic valvular insufficiency in 1 year with an echocardiogram.  He will continue to aggressively treat his irritable bowel disease and arthritis as before.  All his questions were answered to his satisfaction.      Jose Issa MD       cc:   [place] [address]         JOSE ISSA MD, [facility]             D: 2020   T: 2020   MT: JULIAN      Name:     JUANCHO RIOS   MRN:      [id number]        Account:      [id number]   :      1960           Service Date: 2020      Document: [id number]         Outpatient Encounter Medications as of 2020   Medication Sig Dispense Refill     atenolol (TENORMIN) 50 MG tablet Take 1 tablet (50 mg) by mouth daily 90 tablet 3     folic acid (FOLVITE) 1 MG tablet TK 1 T PO QD  10     methotrexate 2.5 MG tablet TK 5 TS PO Q WK       Multiple Vitamins-Minerals (MULTI  COMPLETE PO) Take by mouth daily       potassium citrate (UROCIT-K) 10 MEQ (1080 MG) SR tablet Take 10 mEq by mouth 2 times daily        predniSONE (DELTASONE) 2.5 MG tablet TK 3 TS PO QD  0     sulfaSALAzine ER (AZULFIDINE EN) 500 MG EC tablet TK 2 TS PO BID AFTER MEALS  3     mesalamine (ASACOL HD) 800 MG EC tablet Take 1 tablet (800 mg) by mouth 3 times daily 270 tablet 3     valACYclovir (VALTREX) 1000 mg tablet Take 1 tablet (1,000 mg) by mouth 3 times daily for 7 days 21 tablet 0     [DISCONTINUED] atenolol (TENORMIN) 50 MG tablet Take 1 tablet (50 mg) by mouth daily 90 tablet 0     No facility-administered encounter medications on file as of 1/6/2020.        Again, thank you for allowing me to participate in the care of your patient.      Sincerely,    Michael Whittaker MD     [facility]

## 2020-01-06 NOTE — LETTER
1/6/2020    Otto Ross MD  [facility] [address]  [facility] [address]    RE: Mykel Fishman       Dear Colleague,    I had the pleasure of seeing Mykel Fishman in the [facility].    HPI and Plan:   See dictation:[id number]    Orders Placed This Encounter   Procedures     Follow-Up with Cardiologist     Echocardiogram Complete       Orders Placed This Encounter   Medications     atenolol (TENORMIN) 50 MG tablet     Sig: Take 1 tablet (50 mg) by mouth daily     Dispense:  90 tablet     Refill:  3       Medications Discontinued During This Encounter   Medication Reason     atenolol (TENORMIN) 50 MG tablet Reorder         Encounter Diagnoses   Name Primary?     Aortic root aneurysm (H) Yes     Nonrheumatic aortic valve insufficiency      Ulcerative pancolitis without complication (H)      Inflammatory arthritis        CURRENT MEDICATIONS:  Current Outpatient Medications   Medication Sig Dispense Refill     atenolol (TENORMIN) 50 MG tablet Take 1 tablet (50 mg) by mouth daily 90 tablet 3     folic acid (FOLVITE) 1 MG tablet TK 1 T PO QD  10     methotrexate 2.5 MG tablet TK 5 TS PO Q WK       Multiple Vitamins-Minerals (MULTI COMPLETE PO) Take by mouth daily       potassium citrate (UROCIT-K) 10 MEQ (1080 MG) SR tablet Take 10 mEq by mouth 2 times daily        predniSONE (DELTASONE) 2.5 MG tablet TK 3 TS PO QD  0     sulfaSALAzine ER (AZULFIDINE EN) 500 MG EC tablet TK 2 TS PO BID AFTER MEALS  3     mesalamine (ASACOL HD) 800 MG EC tablet Take 1 tablet (800 mg) by mouth 3 times daily 270 tablet 3     valACYclovir (VALTREX) 1000 mg tablet Take 1 tablet (1,000 mg) by mouth 3 times daily for 7 days 21 tablet 0       ALLERGIES     Allergies   Allergen Reactions     No Clinical Screening - See Comments      NOVACAINE AND LOCAL ANSETHETICS     Penicillins        PAST MEDICAL HISTORY:  Past Medical History:   Diagnosis Date     Aortic regurgitation     +1-2 AR noted on 7/7/2014  Echo     Aortic root aneurysm (H)     4.3 cm noted on 7/7/2014 Echo     Elevated blood pressure      Family history of sudden death     Brother age 41     Inflammatory arthritis 1/2/2020     Kidney stones      Ulcerative pancolitis without complication (H) 10/15/2018       PAST SURGICAL HISTORY:  No past surgical history on file.    FAMILY HISTORY:  Family History   Problem Relation Age of Onset     Coronary Artery Disease Mother      Hypertension Mother      Heart Murmur Father      Coronary Artery Disease Brother        SOCIAL HISTORY:  Social History     Socioeconomic History     Marital status:      Spouse name: None     Number of children: None     Years of education: None     Highest education level: None   Occupational History     None   Social Needs     Financial resource strain: None     Food insecurity:     Worry: None     Inability: None     Transportation needs:     Medical: None     Non-medical: None   Tobacco Use     Smoking status: Former Smoker     Packs/day: 1.00     Start date: 1990     Last attempt to quit: 2005     Years since quitting: 15.0     Smokeless tobacco: Never Used   Substance and Sexual Activity     Alcohol use: Yes     Comment: occasional     Drug use: No     Sexual activity: None   Lifestyle     Physical activity:     Days per week: None     Minutes per session: None     Stress: None   Relationships     Social connections:     Talks on phone: None     Gets together: None     Attends Shinto service: None     Active member of club or organization: None     Attends meetings of clubs or organizations: None     Relationship status: None     Intimate partner violence:     Fear of current or ex partner: None     Emotionally abused: None     Physically abused: None     Forced sexual activity: None   Other Topics Concern     Parent/sibling w/ CABG, MI or angioplasty before 65F 55M? Not Asked      Service Not Asked     Blood Transfusions Not Asked     Caffeine Concern No      "Comment: 2 cups coffee     Occupational Exposure Not Asked     Hobby Hazards Not Asked     Sleep Concern Not Asked     Stress Concern Not Asked     Weight Concern Not Asked     Special Diet Not Asked     Back Care Not Asked     Exercise Yes     Comment: daily, videos     Bike Helmet Not Asked     Seat Belt Yes     Self-Exams Not Asked   Social History Narrative     None       Review of Systems:  Skin:  Negative       Eyes:  Positive for glasses    ENT:  Negative      Respiratory:  Negative       Cardiovascular:  Negative      Gastroenterology: Positive for (colitis)    Genitourinary:  not assessed      Musculoskeletal:  Positive for arthritis    Neurologic:  Negative      Psychiatric:  Negative      Heme/Lymph/Imm:  Positive for allergies    Endocrine:  Negative        Physical Exam:  Vitals: /72   Pulse 62   Ht 1.702 m (5' 7\")   Wt 74.8 kg (165 lb)   BMI 25.84 kg/m       Constitutional:  cooperative, alert and oriented, well developed, well nourished, in no acute distress        Skin:  warm and dry to the touch, no apparent skin lesions or masses noted          Head:  normocephalic, no masses or lesions        Eyes:  pupils equal and round;conjunctivae and lids unremarkable;sclera white;no xanthalasma;EOMS intact        Lymph:      ENT:  no pallor or cyanosis, dentition good        Neck:  carotid pulses are full and equal bilaterally, JVP normal, no carotid bruit        Respiratory:  normal breath sounds, clear to auscultation, normal A-P diameter, normal symmetry, normal respiratory excursion, no use of accessory muscles         Cardiac: regular rhythm, normal S1/S2, no S3 or S4, apical impulse not displaced, no murmurs, gallops or rubs                pulses full and equal, no bruits auscultated                                        GI:  abdomen soft, non-tender, BS normoactive, no mass, no HSM, no bruits        Extremities and Muscular Skeletal:  no deformities, clubbing, cyanosis, erythema observed;no " edema              Neurological:  no gross motor deficits;affect appropriate        Psych:  Alert and Oriented x 3        CC  Otto Ross MD  [place] [phone]                Thank you for allowing me to participate in the care of your patient.      Sincerely,     Michael Whittaker MD     [facility]    cc:   Otto Ross MD  [place] [phone]

## 2020-01-06 NOTE — PROGRESS NOTES
HPI and Plan:   See dictation:[id number]    Orders Placed This Encounter   Procedures     Follow-Up with Cardiologist     Echocardiogram Complete       Orders Placed This Encounter   Medications     atenolol (TENORMIN) 50 MG tablet     Sig: Take 1 tablet (50 mg) by mouth daily     Dispense:  90 tablet     Refill:  3       Medications Discontinued During This Encounter   Medication Reason     atenolol (TENORMIN) 50 MG tablet Reorder         Encounter Diagnoses   Name Primary?     Aortic root aneurysm (H) Yes     Nonrheumatic aortic valve insufficiency      Ulcerative pancolitis without complication (H)      Inflammatory arthritis        CURRENT MEDICATIONS:  Current Outpatient Medications   Medication Sig Dispense Refill     atenolol (TENORMIN) 50 MG tablet Take 1 tablet (50 mg) by mouth daily 90 tablet 3     folic acid (FOLVITE) 1 MG tablet TK 1 T PO QD  10     methotrexate 2.5 MG tablet TK 5 TS PO Q WK       Multiple Vitamins-Minerals (MULTI COMPLETE PO) Take by mouth daily       potassium citrate (UROCIT-K) 10 MEQ (1080 MG) SR tablet Take 10 mEq by mouth 2 times daily        predniSONE (DELTASONE) 2.5 MG tablet TK 3 TS PO QD  0     sulfaSALAzine ER (AZULFIDINE EN) 500 MG EC tablet TK 2 TS PO BID AFTER MEALS  3     mesalamine (ASACOL HD) 800 MG EC tablet Take 1 tablet (800 mg) by mouth 3 times daily 270 tablet 3     valACYclovir (VALTREX) 1000 mg tablet Take 1 tablet (1,000 mg) by mouth 3 times daily for 7 days 21 tablet 0       ALLERGIES     Allergies   Allergen Reactions     No Clinical Screening - See Comments      NOVACAINE AND LOCAL ANSETHETICS     Penicillins        PAST MEDICAL HISTORY:  Past Medical History:   Diagnosis Date     Aortic regurgitation     +1-2 AR noted on 7/7/2014 Echo     Aortic root aneurysm (H)     4.3 cm noted on 7/7/2014 Echo     Elevated blood pressure      Family history of sudden death     Brother age 41     Inflammatory arthritis 1/2/2020     Kidney stones      Ulcerative pancolitis  without complication (H) 10/15/2018       PAST SURGICAL HISTORY:  No past surgical history on file.    FAMILY HISTORY:  Family History   Problem Relation Age of Onset     Coronary Artery Disease Mother      Hypertension Mother      Heart Murmur Father      Coronary Artery Disease Brother        SOCIAL HISTORY:  Social History     Socioeconomic History     Marital status:      Spouse name: None     Number of children: None     Years of education: None     Highest education level: None   Occupational History     None   Social Needs     Financial resource strain: None     Food insecurity:     Worry: None     Inability: None     Transportation needs:     Medical: None     Non-medical: None   Tobacco Use     Smoking status: Former Smoker     Packs/day: 1.00     Start date: 1990     Last attempt to quit: 2005     Years since quitting: 15.0     Smokeless tobacco: Never Used   Substance and Sexual Activity     Alcohol use: Yes     Comment: occasional     Drug use: No     Sexual activity: None   Lifestyle     Physical activity:     Days per week: None     Minutes per session: None     Stress: None   Relationships     Social connections:     Talks on phone: None     Gets together: None     Attends Religion service: None     Active member of club or organization: None     Attends meetings of clubs or organizations: None     Relationship status: None     Intimate partner violence:     Fear of current or ex partner: None     Emotionally abused: None     Physically abused: None     Forced sexual activity: None   Other Topics Concern     Parent/sibling w/ CABG, MI or angioplasty before 65F 55M? Not Asked      Service Not Asked     Blood Transfusions Not Asked     Caffeine Concern No     Comment: 2 cups coffee     Occupational Exposure Not Asked     Hobby Hazards Not Asked     Sleep Concern Not Asked     Stress Concern Not Asked     Weight Concern Not Asked     Special Diet Not Asked     Back Care Not Asked      "Exercise Yes     Comment: daily, videos     Bike Helmet Not Asked     Seat Belt Yes     Self-Exams Not Asked   Social History Narrative     None       Review of Systems:  Skin:  Negative       Eyes:  Positive for glasses    ENT:  Negative      Respiratory:  Negative       Cardiovascular:  Negative      Gastroenterology: Positive for (colitis)    Genitourinary:  not assessed      Musculoskeletal:  Positive for arthritis    Neurologic:  Negative      Psychiatric:  Negative      Heme/Lymph/Imm:  Positive for allergies    Endocrine:  Negative        Physical Exam:  Vitals: /72   Pulse 62   Ht 1.702 m (5' 7\")   Wt 74.8 kg (165 lb)   BMI 25.84 kg/m      Constitutional:  cooperative, alert and oriented, well developed, well nourished, in no acute distress        Skin:  warm and dry to the touch, no apparent skin lesions or masses noted          Head:  normocephalic, no masses or lesions        Eyes:  pupils equal and round;conjunctivae and lids unremarkable;sclera white;no xanthalasma;EOMS intact        Lymph:      ENT:  no pallor or cyanosis, dentition good        Neck:  carotid pulses are full and equal bilaterally, JVP normal, no carotid bruit        Respiratory:  normal breath sounds, clear to auscultation, normal A-P diameter, normal symmetry, normal respiratory excursion, no use of accessory muscles         Cardiac: regular rhythm, normal S1/S2, no S3 or S4, apical impulse not displaced, no murmurs, gallops or rubs                pulses full and equal, no bruits auscultated                                        GI:  abdomen soft, non-tender, BS normoactive, no mass, no HSM, no bruits        Extremities and Muscular Skeletal:  no deformities, clubbing, cyanosis, erythema observed;no edema              Neurological:  no gross motor deficits;affect appropriate        Psych:  Alert and Oriented x 3        CC  Otto Ross MD  [facility]  [address] [place] [address]              "

## 2020-01-07 NOTE — PROGRESS NOTES
Service Date: 01/06/2020      PRIMARY CARE PHYSICIAN:  Dr. Otto Ross.      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  The patient is a 59-year-old male with a history of aortic root dilatation and mild to moderate aortic insufficiency.  He has dilated sinuses of Valsalva measuring approximately 4.7 cm with aortic root of 3.6-3.7 cm more recently.  His last echocardiogram was performed on 01/03.  There was mild concentric left ventricular hypertrophy at the same time.  The aorta has not significantly changed from previous.  The amount of aortic insufficiency is felt to be mild to moderate.  It is important that this patient has been diagnosed with irritable bowel disease along with arthritis.  He has been placed on methotrexate as well as prednisone for this.  It is not uncommon to see aortic root disease as well as aortic insufficiency with irritable bowel disease.  He his left ventricular ejection fraction has been well preserved.  We have treated him with atenolol since 1995.  Literature search had indicated that beta blockers should be used for aortic aneurysms.  The patient's exercise currently is some walking but he is riding his bike less frequent.  He denies angina or shortness of breath.  He denies a history of hypertension.  He remains on a low-salt diet.  He is followed by his GI specialist for his ulcerative colitis and a rheumatologist for his rheumatologic issues.  He denies hospitalizations or surgery since I saw him 1 year ago.  He developed shingles on his leg in July of this year.      PHYSICAL EXAMINATION:  As listed below.  I do not hear any murmur of aortic insufficiency.      ASSESSMENT:   1.  Mykel Fishman is a delightful 59-year-old male with evidence of mild to moderate aortic insufficiency and aortic root and sinus of Valsalva  dilatation.  Certainly etiology of the aortic root dilatation and the aortic insufficiency may be due to the patient's ulcerative colitis and irritable bowel disease.  Fibromuscular dysplasia is less likely.  The patient remains on a beta blocker.  He is a nonsmoker.  I have suggested that he continue with an exercise program.  He has gained some weight.  We would certainly consider surgery for aortic root dilatation if it exceeds 5 cm.  A CT scan would be necessary prior to that.   2.  The patient's cholesterol is being followed by his primary care physician.  I reviewed his last lab results from 10/01/2018 with total cholesterol 152, triglycerides 91, HDL 51 and LDL 83.      It is my pleasure to assist in the care of Juancho Rios.  We will assess his aorta and aortic valvular insufficiency in 1 year with an echocardiogram.  He will continue to aggressively treat his irritable bowel disease and arthritis as before.  All his questions were answered to his satisfaction.      Jose Issa MD       cc:   [place] [address]         JO[facility] ISSA MD, [facility]             D: 2020   T: 2020   MT: JULIAN      Name:     JUANCHO RIOS   MRN:      [id number]        Account:      [id number]   :      1960           Service Date: 2020      Document: [id number]

## 2021-01-15 ENCOUNTER — HEALTH MAINTENANCE LETTER (OUTPATIENT)
Age: 61
End: 2021-01-15

## 2021-01-22 DIAGNOSIS — Q25.43 AORTIC ROOT ANEURYSM: Primary | ICD-10-CM

## 2021-01-29 ENCOUNTER — HOSPITAL ENCOUNTER (OUTPATIENT)
Dept: CARDIOLOGY | Facility: CLINIC | Age: 61
Discharge: HOME | End: 2021-01-29
Attending: INTERNAL MEDICINE | Admitting: INTERNAL MEDICINE
Payer: COMMERCIAL

## 2021-01-29 DIAGNOSIS — Q25.43 AORTIC ROOT ANEURYSM: ICD-10-CM

## 2021-01-29 PROCEDURE — 93306 TTE W/DOPPLER COMPLETE: CPT

## 2021-01-29 PROCEDURE — 93306 TTE W/DOPPLER COMPLETE: CPT | Mod: 26 | Performed by: INTERNAL MEDICINE

## 2021-02-02 ENCOUNTER — OFFICE VISIT (OUTPATIENT)
Dept: CARDIOLOGY | Facility: CLINIC | Age: 61
End: 2021-02-02
Payer: COMMERCIAL

## 2021-02-02 VITALS
HEART RATE: 53 BPM | DIASTOLIC BLOOD PRESSURE: 82 MMHG | WEIGHT: 166.4 LBS | BODY MASS INDEX: 26.12 KG/M2 | HEIGHT: 67 IN | SYSTOLIC BLOOD PRESSURE: 147 MMHG

## 2021-02-02 DIAGNOSIS — Q25.43 AORTIC ROOT ANEURYSM: Primary | ICD-10-CM

## 2021-02-02 DIAGNOSIS — I10 ESSENTIAL HYPERTENSION, BENIGN: ICD-10-CM

## 2021-02-02 PROCEDURE — 93000 ELECTROCARDIOGRAM COMPLETE: CPT | Performed by: INTERNAL MEDICINE

## 2021-02-02 PROCEDURE — 99214 OFFICE O/P EST MOD 30 MIN: CPT | Performed by: INTERNAL MEDICINE

## 2021-02-02 RX ORDER — ATENOLOL 25 MG/1
25 TABLET ORAL DAILY
Qty: 90 TABLET | Refills: 3 | Status: SHIPPED | OUTPATIENT
Start: 2021-02-02 | End: 2022-01-25

## 2021-02-02 RX ORDER — LISINOPRIL 5 MG/1
5 TABLET ORAL DAILY
Qty: 90 TABLET | Refills: 3 | Status: SHIPPED | OUTPATIENT
Start: 2021-02-02 | End: 2022-01-25

## 2021-02-02 ASSESSMENT — MIFFLIN-ST. JEOR: SCORE: 1528.42

## 2021-02-02 NOTE — PROGRESS NOTES
Cardiology Progress Note          Assessment and Plan:       1. Very stable aortic root enlargement 4.6-4.7 cm, unchanged over >5 years with family history of aortopathy in his brother    We discussed that the stability of his aortic root is very reassuring.  Would like better blood pressure control.  Diastolic blood pressure control is probably more important than beta-blockade given his root enlargement without ascending aortic enlargement.  Will reduce his atenolol secondary to bradycardia and chronotropic blunting to 25 mg daily and add lisinopril 5 mg daily.  Patient will get a blood pressure cuff and let us know if blood pressures are consistently over 130 mmHg and we can adjust his antihypertensive therapy.  He will also keep track to see if he has any improvement in his energy level when decreasing the beta-blocker.    Routine follow-up in 1 year.  I do not feel that we need to repeat the echo every year since it has been extremely stable in size and is the aortic root and not the ascending aorta that is enlarged.        30 minutes was spent with the patient, precharting and reviewing tests as well as post visit charting.    This note was transcribed using electronic voice recognition software and there may be typographical errors present.                    Interval History:     The patient is a very pleasant 60 year old with arthritis and colitis who has been followed in cardiology clinic for aortic root enlargement stable at 4.6-4.7 cm for a number of years.  I have looked back at least 5 years and the aortic root has not changed.  He does not have significant ascending aortic enlargement, last checked at around 3.6 cm.    He is somewhat bradycardic.  With exertion, he notes his heart rate only gets up to about 110.  He does not feel limited and does physical labor at his job without dyspnea.    He is currently on atenolol 50 mg daily.  I reviewed his ECG from today.                       Review of  "Systems:     Review of Systems:  Skin:  Negative     Eyes:  Negative    ENT:  Negative    Respiratory:  Negative    Cardiovascular:  Negative    Gastroenterology: Negative    Genitourinary:  Negative    Musculoskeletal:  Positive for arthritis  Neurologic:  Negative    Psychiatric:  Negative    Heme/Lymph/Imm:  Negative    Endocrine:  Negative                Physical Exam:     Vitals: BP (!) 147/82   Pulse 53   Ht 1.702 m (5' 7\")   Wt 75.5 kg (166 lb 6.4 oz)   BMI 26.06 kg/m    Constitutional:  cooperative, alert and oriented, well developed, well nourished, in no acute distress        Skin:  warm and dry to the touch, no apparent skin lesions or masses noted        Head:  normocephalic, no masses or lesions        Eyes:  pupils equal and round;conjunctivae and lids unremarkable;sclera white;no xanthalasma;EOMS intact        Chest:  normal breath sounds, clear to auscultation, normal A-P diameter, normal symmetry, normal respiratory excursion, no use of accessory muscles        Cardiac: normal S1 and S2           grade 1;early diastolic murmur      Extremities and Back:  no deformities, clubbing, cyanosis, erythema observed;no edema        Neurological:  no gross motor deficits;affect appropriate                 Medications:     Current Outpatient Medications   Medication Sig Dispense Refill     atenolol (TENORMIN) 25 MG tablet Take 1 tablet (25 mg) by mouth daily 90 tablet 3     folic acid (FOLVITE) 1 MG tablet TK 1 T PO QD  10     lisinopril (ZESTRIL) 5 MG tablet Take 1 tablet (5 mg) by mouth daily 90 tablet 3     methotrexate 2.5 MG tablet TK 5 TS PO Q WK       Multiple Vitamins-Minerals (MULTI COMPLETE PO) Take by mouth daily       potassium citrate (UROCIT-K) 10 MEQ (1080 MG) SR tablet Take 10 mEq by mouth 2 times daily prn       sulfaSALAzine ER (AZULFIDINE EN) 500 MG EC tablet TK 2 TS PO BID AFTER MEALS  3     predniSONE (DELTASONE) 2.5 MG tablet 2.5 mg Three tabs per week  0                Data:   All " laboratory data reviewed  No results found for this or any previous visit (from the past 24 hour(s)).    All laboratory data reviewed  Lab Results   Component Value Date    CHOL 128 07/06/2015     Lab Results   Component Value Date    HDL 46 07/06/2015     Lab Results   Component Value Date    LDL 71 07/06/2015     Lab Results   Component Value Date    TRIG 55 07/06/2015     Lab Results   Component Value Date    CHOLHDLRATIO 2.80 04/28/2014     No results found for: TSH  Last Basic Metabolic Panel:  Lab Results   Component Value Date     10/12/2015      Lab Results   Component Value Date    POTASSIUM 4.0 10/12/2015     Lab Results   Component Value Date    CHLORIDE 105 10/12/2015     Lab Results   Component Value Date    SURESH 9.3 10/12/2015     Lab Results   Component Value Date    CO2 22 10/12/2015     Lab Results   Component Value Date    BUN 20 10/12/2015     Lab Results   Component Value Date    CR 1.02 10/12/2015     Lab Results   Component Value Date     10/12/2015     Lab Results   Component Value Date    WBC 10.2 06/14/2015     Lab Results   Component Value Date    RBC 5.28 06/14/2015     Lab Results   Component Value Date    HGB 15.9 06/14/2015     Lab Results   Component Value Date    HCT 46.9 06/14/2015     Lab Results   Component Value Date    MCV 89 06/14/2015     Lab Results   Component Value Date    MCH 30.1 06/14/2015     Lab Results   Component Value Date    MCHC 33.9 06/14/2015     Lab Results   Component Value Date    RDW 13.0 06/14/2015     Lab Results   Component Value Date     06/14/2015

## 2021-02-02 NOTE — LETTER
2/2/2021    Otto Ross MD  [facility]  [facility] [address]    RE: Mykel Fishman       Dear Colleague,    I had the pleasure of seeing Mykel Fishman in the [facility].    Cardiology Progress Note          Assessment and Plan:       1. Very stable aortic root enlargement 4.6-4.7 cm, unchanged over >5 years with family history of aortopathy in his brother    We discussed that the stability of his aortic root is very reassuring.  Would like better blood pressure control.  Diastolic blood pressure control is probably more important than beta-blockade given his root enlargement without ascending aortic enlargement.  Will reduce his atenolol secondary to bradycardia and chronotropic blunting to 25 mg daily and add lisinopril 5 mg daily.  Patient will get a blood pressure cuff and let us know if blood pressures are consistently over 130 mmHg and we can adjust his antihypertensive therapy.  He will also keep track to see if he has any improvement in his energy level when decreasing the beta-blocker.    Routine follow-up in 1 year.  I do not feel that we need to repeat the echo every year since it has been extremely stable in size and is the aortic root and not the ascending aorta that is enlarged.        30 minutes was spent with the patient, precharting and reviewing tests as well as post visit charting.    This note was transcribed using electronic voice recognition software and there may be typographical errors present.                    Interval History:     The patient is a very pleasant 60 year old with arthritis and colitis who has been followed in cardiology clinic for aortic root enlargement stable at 4.6-4.7 cm for a number of years.  I have looked back at least 5 years and the aortic root has not changed.  He does not have significant ascending aortic enlargement, last checked at around 3.6 cm.    He is somewhat bradycardic.  With exertion, he notes  "his heart rate only gets up to about 110.  He does not feel limited and does physical labor at his job without dyspnea.    He is currently on atenolol 50 mg daily.  I reviewed his ECG from today.                       Review of Systems:     Review of Systems:  Skin:  Negative     Eyes:  Negative    ENT:  Negative    Respiratory:  Negative    Cardiovascular:  Negative    Gastroenterology: Negative    Genitourinary:  Negative    Musculoskeletal:  Positive for arthritis  Neurologic:  Negative    Psychiatric:  Negative    Heme/Lymph/Imm:  Negative    Endocrine:  Negative                Physical Exam:     Vitals: BP (!) 147/82   Pulse 53   Ht 1.702 m (5' 7\")   Wt 75.5 kg (166 lb 6.4 oz)   BMI 26.06 kg/m    Constitutional:  cooperative, alert and oriented, well developed, well nourished, in no acute distress        Skin:  warm and dry to the touch, no apparent skin lesions or masses noted        Head:  normocephalic, no masses or lesions        Eyes:  pupils equal and round;conjunctivae and lids unremarkable;sclera white;no xanthalasma;EOMS intact        Chest:  normal breath sounds, clear to auscultation, normal A-P diameter, normal symmetry, normal respiratory excursion, no use of accessory muscles        Cardiac: normal S1 and S2           grade 1;early diastolic murmur      Extremities and Back:  no deformities, clubbing, cyanosis, erythema observed;no edema        Neurological:  no gross motor deficits;affect appropriate                 Medications:     Current Outpatient Medications   Medication Sig Dispense Refill     atenolol (TENORMIN) 25 MG tablet Take 1 tablet (25 mg) by mouth daily 90 tablet 3     folic acid (FOLVITE) 1 MG tablet TK 1 T PO QD  10     lisinopril (ZESTRIL) 5 MG tablet Take 1 tablet (5 mg) by mouth daily 90 tablet 3     methotrexate 2.5 MG tablet TK 5 TS PO Q WK       Multiple Vitamins-Minerals (MULTI COMPLETE PO) Take by mouth daily       potassium citrate (UROCIT-K) 10 MEQ (1080 MG) SR " tablet Take 10 mEq by mouth 2 times daily prn       sulfaSALAzine ER (AZULFIDINE EN) 500 MG EC tablet TK 2 TS PO BID AFTER MEALS  3     predniSONE (DELTASONE) 2.5 MG tablet 2.5 mg Three tabs per week  0                Data:   All laboratory data reviewed  No results found for this or any previous visit (from the past 24 hour(s)).    All laboratory data reviewed  Lab Results   Component Value Date    CHOL 128 07/06/2015     Lab Results   Component Value Date    HDL 46 07/06/2015     Lab Results   Component Value Date    LDL 71 07/06/2015     Lab Results   Component Value Date    TRIG 55 07/06/2015     Lab Results   Component Value Date    CHOLHDLRATIO 2.80 04/28/2014     No results found for: TSH  Last Basic Metabolic Panel:  Lab Results   Component Value Date     10/12/2015      Lab Results   Component Value Date    POTASSIUM 4.0 10/12/2015     Lab Results   Component Value Date    CHLORIDE 105 10/12/2015     Lab Results   Component Value Date    SURESH 9.3 10/12/2015     Lab Results   Component Value Date    CO2 22 10/12/2015     Lab Results   Component Value Date    BUN 20 10/12/2015     Lab Results   Component Value Date    CR 1.02 10/12/2015     Lab Results   Component Value Date     10/12/2015     Lab Results   Component Value Date    WBC 10.2 06/14/2015     Lab Results   Component Value Date    RBC 5.28 06/14/2015     Lab Results   Component Value Date    HGB 15.9 06/14/2015     Lab Results   Component Value Date    HCT 46.9 06/14/2015     Lab Results   Component Value Date    MCV 89 06/14/2015     Lab Results   Component Value Date    MCH 30.1 06/14/2015     Lab Results   Component Value Date    MCHC 33.9 06/14/2015     Lab Results   Component Value Date    RDW 13.0 06/14/2015     Lab Results   Component Value Date     06/14/2015       Thank you for allowing me to participate in the care of your patient.    Sincerely,     Ethan Henry MD     [facility]

## 2021-09-04 ENCOUNTER — HEALTH MAINTENANCE LETTER (OUTPATIENT)
Age: 61
End: 2021-09-04

## 2022-01-25 DIAGNOSIS — Q25.43 AORTIC ROOT ANEURYSM: ICD-10-CM

## 2022-01-25 DIAGNOSIS — I10 ESSENTIAL HYPERTENSION, BENIGN: ICD-10-CM

## 2022-01-25 RX ORDER — ATENOLOL 25 MG/1
25 TABLET ORAL DAILY
Qty: 90 TABLET | Refills: 0 | Status: SHIPPED | OUTPATIENT
Start: 2022-01-25 | End: 2022-11-06

## 2022-01-25 RX ORDER — LISINOPRIL 5 MG/1
5 TABLET ORAL DAILY
Qty: 90 TABLET | Refills: 0 | Status: SHIPPED | OUTPATIENT
Start: 2022-01-25 | End: 2022-11-06

## 2022-02-19 ENCOUNTER — HEALTH MAINTENANCE LETTER (OUTPATIENT)
Age: 62
End: 2022-02-19

## 2022-05-25 ENCOUNTER — OFFICE VISIT (OUTPATIENT)
Dept: CARDIOLOGY | Facility: CLINIC | Age: 62
End: 2022-05-25
Payer: COMMERCIAL

## 2022-05-25 VITALS
HEART RATE: 54 BPM | HEIGHT: 67 IN | SYSTOLIC BLOOD PRESSURE: 135 MMHG | OXYGEN SATURATION: 98 % | BODY MASS INDEX: 25.82 KG/M2 | WEIGHT: 164.5 LBS | DIASTOLIC BLOOD PRESSURE: 75 MMHG

## 2022-05-25 DIAGNOSIS — Q25.43 AORTIC ROOT ANEURYSM: Primary | ICD-10-CM

## 2022-05-25 DIAGNOSIS — I10 ESSENTIAL HYPERTENSION, BENIGN: ICD-10-CM

## 2022-05-25 PROCEDURE — 99214 OFFICE O/P EST MOD 30 MIN: CPT | Performed by: INTERNAL MEDICINE

## 2022-05-25 NOTE — PROGRESS NOTES
Cardiology Progress Note          Assessment and Plan:       1. Aortic root enlargement, stable 4.6 cm    Recheck in 1 year which will be 2 years from last echocardiogram.    Clinical follow-up at that time.      2. Family history of sudden cardiac death, likely aortic valve stenosis    Brother on a [place] at age 41.  Supposedly the narrowest valve they had ever seen.  He was scheduled to see cardiology but  suddenly before then.    30 minutes was spent with the patient, precharting and reviewing tests as well as post visit charting all done today..    This note was transcribed using electronic voice recognition software and there may be typographical errors present.                    Interval History:     The patient is a very pleasant 61 year old whom I have been following for very stable aortic root enlargement without significant ascending aortic enlargement.  He states his brother  of severely stenosed valve suddenly on a golf [place] age 41, probably bicuspid or unicuspid valve.  When I look at Mykel's CT from , no significant coronary artery disease or vascular calcification that I can tell.    Patient feels well.  Asymptomatic bradycardia.  We reduced the atenolol from 50 mg down to 25 mg last year and he has been doing well.  He states he can do anything he wants to really.                     Review of Systems:     Review of Systems:  Skin:  Negative     Eyes:  Negative glasses  ENT:  not assessed    Respiratory:  Negative shortness of breath;sleep apnea (cold/ bronchitis 2016)  Cardiovascular:  Negative;palpitations;chest pain;lightheadedness;dizziness;edema;fatigue    Gastroenterology: not assessed  (colitis)  Genitourinary:  Negative    Musculoskeletal:  Positive for arthritis  Neurologic:  not assessed    Psychiatric:  not assessed    Heme/Lymph/Imm:  not assessed allergies  Endocrine:  Negative thyroid disorder;diabetes              Physical Exam:     Vitals: /75   Pulse  "54   Ht 1.702 m (5' 7\")   Wt 74.6 kg (164 lb 8 oz)   SpO2 98%   BMI 25.76 kg/m    Constitutional:  cooperative, alert and oriented, well developed, well nourished, in no acute distress        Skin:  warm and dry to the touch, no apparent skin lesions or masses noted        Head:  normocephalic, no masses or lesions        Eyes:  pupils equal and round;conjunctivae and lids unremarkable;sclera white;no xanthalasma;EOMS intact        Chest:  normal symmetry        Cardiac: normal S1 and S2           grade 1;early diastolic murmur      Extremities and Back:  no deformities, clubbing, cyanosis, erythema observed;no edema        Neurological:  no gross motor deficits;affect appropriate                 Medications:     Current Outpatient Medications   Medication Sig Dispense Refill     atenolol (TENORMIN) 25 MG tablet Take 1 tablet (25 mg) by mouth daily 90 tablet 0     folic acid (FOLVITE) 1 MG tablet TK 1 T PO QD  10     lisinopril (ZESTRIL) 5 MG tablet Take 1 tablet (5 mg) by mouth daily 90 tablet 0     methotrexate 2.5 MG tablet TK 5 TS PO Q WK       Multiple Vitamins-Minerals (MULTI COMPLETE PO) Take by mouth daily       sulfaSALAzine ER (AZULFIDINE EN) 500 MG EC tablet TK 2 TS PO BID AFTER MEALS  3     predniSONE (DELTASONE) 2.5 MG tablet 2.5 mg Three tabs per week (Patient not taking: Reported on 5/25/2022)  0                Data:   All laboratory data reviewed  No results found for this or any previous visit (from the past 24 hour(s)).    All laboratory data reviewed  Lab Results   Component Value Date    CHOL 128 07/06/2015     Lab Results   Component Value Date    HDL 46 07/06/2015     Lab Results   Component Value Date    LDL 71 07/06/2015     Lab Results   Component Value Date    TRIG 55 07/06/2015     Lab Results   Component Value Date    CHOLHDLRATIO 2.80 04/28/2014     No results found for: TSH  Last Basic Metabolic Panel:  Lab Results   Component Value Date     10/12/2015      Lab Results "   Component Value Date    POTASSIUM 4.0 10/12/2015     Lab Results   Component Value Date    CHLORIDE 105 10/12/2015     Lab Results   Component Value Date    SURESH 9.3 10/12/2015     Lab Results   Component Value Date    CO2 22 10/12/2015     Lab Results   Component Value Date    BUN 20 10/12/2015     Lab Results   Component Value Date    CR 1.02 10/12/2015     Lab Results   Component Value Date     10/12/2015     Lab Results   Component Value Date    WBC 10.2 06/14/2015     Lab Results   Component Value Date    RBC 5.28 06/14/2015     Lab Results   Component Value Date    HGB 15.9 06/14/2015     Lab Results   Component Value Date    HCT 46.9 06/14/2015     Lab Results   Component Value Date    MCV 89 06/14/2015     Lab Results   Component Value Date    MCH 30.1 06/14/2015     Lab Results   Component Value Date    MCHC 33.9 06/14/2015     Lab Results   Component Value Date    RDW 13.0 06/14/2015     Lab Results   Component Value Date     06/14/2015

## 2022-05-25 NOTE — LETTER
2022    Otto Ross MD  [facility] [address]  [facility] [address]    RE: Mykel MARKS Sravanthi       Dear Colleague,     I had the pleasure of seeing Mykel Fishman in the [facility].  Cardiology Progress Note          Assessment and Plan:       1. Aortic root enlargement, stable 4.6 cm    Recheck in 1 year which will be 2 years from last echocardiogram.    Clinical follow-up at that time.      2. Family history of sudden cardiac death, likely aortic valve stenosis    Brother on a golf course at age 41.  Supposedly the narrowest valve they had ever seen.  He was scheduled to see cardiology but  suddenly before then.    30 minutes was spent with the patient, precharting and reviewing tests as well as post visit charting all done today..    This note was transcribed using electronic voice recognition software and there may be typographical errors present.                    Interval History:     The patient is a very pleasant 61 year old whom I have been following for very stable aortic root enlargement without significant ascending aortic enlargement.  He states his brother  of severely stenosed valve suddenly [place] a [place] age 41, probably bicuspid or unicuspid valve.  When I look at Mykel's CT from , no significant coronary artery disease or vascular calcification that I can tell.    Patient feels well.  Asymptomatic bradycardia.  We reduced the atenolol from 50 mg down to 25 mg last year and he has been doing well.  He states he can do anything he wants to really.                     Review of Systems:     Review of Systems:  Skin:  Negative     Eyes:  Negative glasses  ENT:  not assessed    Respiratory:  Negative shortness of breath;sleep apnea (cold/ bronchitis 2016)  Cardiovascular:  Negative;palpitations;chest pain;lightheadedness;dizziness;edema;fatigue    Gastroenterology: not assessed  (colitis)  Genitourinary:  Negative    Musculoskeletal:  Positive  "for arthritis  Neurologic:  not assessed    Psychiatric:  not assessed    Heme/Lymph/Imm:  not assessed allergies  Endocrine:  Negative thyroid disorder;diabetes              Physical Exam:     Vitals: /75   Pulse 54   Ht 1.702 m (5' 7\")   Wt 74.6 kg (164 lb 8 oz)   SpO2 98%   BMI 25.76 kg/m    Constitutional:  cooperative, alert and oriented, well developed, well nourished, in no acute distress        Skin:  warm and dry to the touch, no apparent skin lesions or masses noted        Head:  normocephalic, no masses or lesions        Eyes:  pupils equal and round;conjunctivae and lids unremarkable;sclera white;no xanthalasma;EOMS intact        Chest:  normal symmetry        Cardiac: normal S1 and S2           grade 1;early diastolic murmur      Extremities and Back:  no deformities, clubbing, cyanosis, erythema observed;no edema        Neurological:  no gross motor deficits;affect appropriate                 Medications:     Current Outpatient Medications   Medication Sig Dispense Refill     atenolol (TENORMIN) 25 MG tablet Take 1 tablet (25 mg) by mouth daily 90 tablet 0     folic acid (FOLVITE) 1 MG tablet TK 1 T PO QD  10     lisinopril (ZESTRIL) 5 MG tablet Take 1 tablet (5 mg) by mouth daily 90 tablet 0     methotrexate 2.5 MG tablet TK 5 TS PO Q WK       Multiple Vitamins-Minerals (MULTI COMPLETE PO) Take by mouth daily       sulfaSALAzine ER (AZULFIDINE EN) 500 MG EC tablet TK 2 TS PO BID AFTER MEALS  3     predniSONE (DELTASONE) 2.5 MG tablet 2.5 mg Three tabs per week (Patient not taking: Reported on 5/25/2022)  0                Data:   All laboratory data reviewed  No results found for this or any previous visit (from the past 24 hour(s)).    All laboratory data reviewed  Lab Results   Component Value Date    CHOL 128 07/06/2015     Lab Results   Component Value Date    HDL 46 07/06/2015     Lab Results   Component Value Date    LDL 71 07/06/2015     Lab Results   Component Value Date    TRIG 55 " 07/06/2015     Lab Results   Component Value Date    CHOLHDLRATIO 2.80 04/28/2014     No results found for: TSH  Last Basic Metabolic Panel:  Lab Results   Component Value Date     10/12/2015      Lab Results   Component Value Date    POTASSIUM 4.0 10/12/2015     Lab Results   Component Value Date    CHLORIDE 105 10/12/2015     Lab Results   Component Value Date    SURESH 9.3 10/12/2015     Lab Results   Component Value Date    CO2 22 10/12/2015     Lab Results   Component Value Date    BUN 20 10/12/2015     Lab Results   Component Value Date    CR 1.02 10/12/2015     Lab Results   Component Value Date     10/12/2015     Lab Results   Component Value Date    WBC 10.2 06/14/2015     Lab Results   Component Value Date    RBC 5.28 06/14/2015     Lab Results   Component Value Date    HGB 15.9 06/14/2015     Lab Results   Component Value Date    HCT 46.9 06/14/2015     Lab Results   Component Value Date    MCV 89 06/14/2015     Lab Results   Component Value Date    MCH 30.1 06/14/2015     Lab Results   Component Value Date    MCHC 33.9 06/14/2015     Lab Results   Component Value Date    RDW 13.0 06/14/2015     Lab Results   Component Value Date     06/14/2015     Thank you for allowing me to participate in the care of your patient.      Sincerely,     Ethan Henry MD     [facility]  cc:   Otto Ross MD  [facility]  [address] [place] [address]

## 2022-07-03 ENCOUNTER — OFFICE VISIT (OUTPATIENT)
Dept: URGENT CARE | Facility: URGENT CARE | Age: 62
End: 2022-07-03
Payer: COMMERCIAL

## 2022-07-03 VITALS
HEART RATE: 67 BPM | SYSTOLIC BLOOD PRESSURE: 148 MMHG | TEMPERATURE: 97.3 F | DIASTOLIC BLOOD PRESSURE: 84 MMHG | OXYGEN SATURATION: 99 %

## 2022-07-03 DIAGNOSIS — R09.82 POST-NASAL DRIP: ICD-10-CM

## 2022-07-03 DIAGNOSIS — R07.0 THROAT PAIN: Primary | ICD-10-CM

## 2022-07-03 LAB
DEPRECATED S PYO AG THROAT QL EIA: NEGATIVE
GROUP A STREP BY PCR: NOT DETECTED

## 2022-07-03 PROCEDURE — 87651 STREP A DNA AMP PROBE: CPT | Performed by: PHYSICIAN ASSISTANT

## 2022-07-03 PROCEDURE — 99213 OFFICE O/P EST LOW 20 MIN: CPT | Mod: CS | Performed by: PHYSICIAN ASSISTANT

## 2022-07-03 PROCEDURE — U0005 INFEC AGEN DETEC AMPLI PROBE: HCPCS | Performed by: PHYSICIAN ASSISTANT

## 2022-07-03 PROCEDURE — U0003 INFECTIOUS AGENT DETECTION BY NUCLEIC ACID (DNA OR RNA); SEVERE ACUTE RESPIRATORY SYNDROME CORONAVIRUS 2 (SARS-COV-2) (CORONAVIRUS DISEASE [COVID-19]), AMPLIFIED PROBE TECHNIQUE, MAKING USE OF HIGH THROUGHPUT TECHNOLOGIES AS DESCRIBED BY CMS-2020-01-R: HCPCS | Performed by: PHYSICIAN ASSISTANT

## 2022-07-03 RX ORDER — CETIRIZINE HYDROCHLORIDE 10 MG/1
10 TABLET ORAL EVERY EVENING
Qty: 30 TABLET | Refills: 0 | Status: SHIPPED | OUTPATIENT
Start: 2022-07-03 | End: 2023-05-12

## 2022-07-03 NOTE — PROGRESS NOTES
Patient presents with:  Urgent Care: throat pain/hurts to swallow off/on since Friday.      (R07.0) Throat pain  (primary encounter diagnosis)  Comment:   Plan: Symptomatic; Yes; 7/1/2022 COVID-19 Virus         (Coronavirus) by PCR Nose, Streptococcus A         Rapid Screen w/Reflex to PCR, Group A         Streptococcus PCR Throat Swab            (R09.82) Post-nasal drip  Comment:   Plan: cetirizine (ZYRTEC) 10 MG tablet                SUBJECTIVE:   Mykel Fishman is a 61 year old male who presents today with a sore throat for the past 2 days.  Feels congestion in the back of the throat at night.      Denies any fevers.      Home covid test was negative.          Past Medical History:   Diagnosis Date     Aortic regurgitation     +1-2 AR noted on 7/7/2014 Echo     Aortic root aneurysm (H)     4.3 cm noted on 7/7/2014 Echo     Elevated blood pressure      Family history of sudden death     Brother age 41     Inflammatory arthritis 1/2/2020     Kidney stones      Ulcerative pancolitis without complication (H) 10/15/2018         Current Outpatient Medications   Medication Sig Dispense Refill     Multiple Vitamins-Iron (DAILY-EPHRAIM/IRON/BETA-CAROTENE) TABS TAKE 1 TABLET BY MOUTH DAILY. (Patient not taking: Reported on 10/19/2020) 30 tablet 7     Social History     Tobacco Use     Smoking status: Never Smoker     Smokeless tobacco: Never Used   Substance Use Topics     Alcohol use: Not on file     Family History   Problem Relation Age of Onset     Diabetes Mother      Diabetes Father          ROS:    10 point ROS of systems including Constitutional, Eyes, Respiratory, Cardiovascular, Gastroenterology, Genitourinary, Integumentary, Muscularskeletal, Psychiatric ,neurological were all negative except for pertinent positives noted in my HPI       OBJECTIVE:  BP (!) 148/84 (BP Location: Left arm, Patient Position: Sitting, Cuff Size: Adult Regular)   Pulse 67   Temp 97.3  F (36.3  C) (Tympanic)   SpO2 99%   Physical  Exam:  GENERAL APPEARANCE: healthy, alert and no distress  EYES: EOMI,  PERRL, conjunctiva clear  HENT: ear canals and TM's normal.  Nose and mouth without ulcers, erythema or lesions  HENT: rhinorrhea clear  NECK: supple, nontender, no lymphadenopathy  RESP: lungs clear to auscultation - no rales, rhonchi or wheezes  CV: regular rates and rhythm, normal S1 S2, no murmur noted  NEURO: Normal strength and tone, sensory exam grossly normal,  normal speech and mentation  SKIN: no suspicious lesions or rashes

## 2022-07-03 NOTE — PATIENT INSTRUCTIONS
(R07.0) Throat pain  (primary encounter diagnosis)  Comment:   Plan: Symptomatic; Yes; 7/1/2022 COVID-19 Virus         (Coronavirus) by PCR Nose, Streptococcus A         Rapid Screen w/Reflex to PCR, Group A         Streptococcus PCR Throat Swab            (R09.82) Post-nasal drip  Comment:   Plan: cetirizine (ZYRTEC) 10 MG tablet

## 2022-07-04 LAB — SARS-COV-2 RNA RESP QL NAA+PROBE: NEGATIVE

## 2022-10-22 ENCOUNTER — HEALTH MAINTENANCE LETTER (OUTPATIENT)
Age: 62
End: 2022-10-22

## 2022-10-27 ENCOUNTER — APPOINTMENT (OUTPATIENT)
Dept: GENERAL RADIOLOGY | Facility: CLINIC | Age: 62
End: 2022-10-27
Attending: EMERGENCY MEDICINE
Payer: COMMERCIAL

## 2022-10-27 ENCOUNTER — HOSPITAL ENCOUNTER (EMERGENCY)
Facility: CLINIC | Age: 62
Discharge: HOME | End: 2022-10-27
Attending: EMERGENCY MEDICINE | Admitting: EMERGENCY MEDICINE
Payer: COMMERCIAL

## 2022-10-27 VITALS
TEMPERATURE: 98 F | DIASTOLIC BLOOD PRESSURE: 83 MMHG | RESPIRATION RATE: 20 BRPM | HEART RATE: 64 BPM | SYSTOLIC BLOOD PRESSURE: 160 MMHG | OXYGEN SATURATION: 98 %

## 2022-10-27 DIAGNOSIS — S43.005A SHOULDER DISLOCATION, LEFT, INITIAL ENCOUNTER: ICD-10-CM

## 2022-10-27 DIAGNOSIS — S42.292A HILL SACHS DEFORMITY, LEFT: ICD-10-CM

## 2022-10-27 PROCEDURE — 99285 EMERGENCY DEPT VISIT HI MDM: CPT | Mod: 25

## 2022-10-27 PROCEDURE — 250N000011 HC RX IP 250 OP 636: Performed by: EMERGENCY MEDICINE

## 2022-10-27 PROCEDURE — 96374 THER/PROPH/DIAG INJ IV PUSH: CPT

## 2022-10-27 PROCEDURE — 999N000065 XR SHOULDER LEFT 2 VIEWS: Mod: LT

## 2022-10-27 PROCEDURE — 23665 CLTX SHO DSLC FX GR HMRL TBR: CPT | Mod: LT

## 2022-10-27 PROCEDURE — 250N000011 HC RX IP 250 OP 636

## 2022-10-27 PROCEDURE — 73030 X-RAY EXAM OF SHOULDER: CPT | Mod: LT

## 2022-10-27 PROCEDURE — 96372 THER/PROPH/DIAG INJ SC/IM: CPT | Performed by: EMERGENCY MEDICINE

## 2022-10-27 RX ORDER — HYDROMORPHONE HYDROCHLORIDE 1 MG/ML
INJECTION, SOLUTION INTRAMUSCULAR; INTRAVENOUS; SUBCUTANEOUS
Status: DISCONTINUED
Start: 2022-10-27 | End: 2022-10-27 | Stop reason: HOSPADM

## 2022-10-27 RX ORDER — HYDROMORPHONE HYDROCHLORIDE 1 MG/ML
0.5 INJECTION, SOLUTION INTRAMUSCULAR; INTRAVENOUS; SUBCUTANEOUS ONCE
Status: COMPLETED | OUTPATIENT
Start: 2022-10-27 | End: 2022-10-27

## 2022-10-27 RX ADMIN — HYDROMORPHONE HYDROCHLORIDE 0.5 MG: 1 INJECTION, SOLUTION INTRAMUSCULAR; INTRAVENOUS; SUBCUTANEOUS at 03:05

## 2022-10-27 RX ADMIN — HYDROMORPHONE HYDROCHLORIDE 0.5 MG: 1 INJECTION, SOLUTION INTRAMUSCULAR; INTRAVENOUS; SUBCUTANEOUS at 02:42

## 2022-10-27 ASSESSMENT — ENCOUNTER SYMPTOMS: ARTHRALGIAS: 1

## 2022-10-27 ASSESSMENT — ACTIVITIES OF DAILY LIVING (ADL): ADLS_ACUITY_SCORE: 35

## 2022-10-27 NOTE — ED PROVIDER NOTES
History   Chief Complaint:  Shoulder pain    HPI   Mykel Fishman is a 62 year old male, R. Hand dominant, who presents with shoulder pain. The patient reports he was talking to his cat tonight when he made a sudden movement. He states after he had onset of left shoulder pain and reduced range of movement. The patient reports that he has dislocated his left shoulder multiple times with his last dislocation about 10 years ago. The patient denies any other concerns at this time. No paresthesias or other symptoms reported.      Review of Systems   Musculoskeletal: Positive for arthralgias.   All other systems reviewed and are negative.      Allergies:  Penicillins    Medications:  Tenormin   Zestril   Azulfidine   Methotrexate  Remeron     Past Medical History:     Aortic root aneurysm   Aortic regurgitation   Ulcerative pancolitis w/ complication   Ulcerative colitis   Inflammatory arthritis   Anxiety   Mitral valve prolapse     Family History:    Mother- CAD, hypertension   Father- heart murmur   Brother- CAD    Social History:  The patient presents to the ED with his brother in law.   Living Situation: lives alone   Has cat Randy  PCP: Otto Ross     Physical Exam     Patient Vitals for the past 24 hrs:   BP Temp Temp src Pulse Resp SpO2   10/27/22 0216 (!) 160/83 98  F (36.7  C) Temporal 64 20 98 %       Physical Exam  Nursing note and vitals reviewed.  Constitutional: Well nourished. Resting comfortably.   Eyes: Conjunctiva normal.  Pupils are equal, round, and reactive to light.   ENT: Nose normal. Mucous membranes pink and moist.    Neck: Normal range of motion.  CVS: Normal rate, regular rhythm.  Normal heart sounds. 2/2 radial pulses  Pulmonary: Lungs clear to auscultation bilaterally. No wheezes/rales/rhonchi.  GI: Abdomen soft. Nontender, nondistended. No rigidity or guarding.    MSK: No calf tenderness or swelling. L. Shoulder with obvious step off, tenderness to palpation, no ecchymosis/warmth.  Decreased active ROM 2/2 to pain.   Neuro: Alert. Follows simple commands. Sensation intact x 4.   Skin: Skin is warm and dry. No rash noted.   Psychiatric: Normal affect.         Emergency Department Course     Imaging:  XR Shoulder Left 2 Views   Final Result   IMPRESSION: Interval reduction of the left glenohumeral joint. No appreciable fractures. There is mild osteoarthritis of the acromioclavicular and glenohumeral joints.      XR Shoulder Left 2 Views   Final Result   IMPRESSION: Anterior dislocation of the proximal left humerus, which is an engaged along the anterior aspect of the glenoid. There is an associated Hill-Sachs deformity of the posterolateral humeral head. No definitive glenoid fracture.      No significant osteoarthritic changes.        Report per radiology      Procedures    Dislocation Reduction   Procedure: Dislocation Reduction  Consent: Verbal from Patient  Risks Discussed: Pain, need for repeat attempts, fracture, neurovascular injury, unsuccessful attempts and need to go to OR  Universal Protocol: Universal protocol was followed and time out conducted just prior to starting procedure, confirming patient identity, site/side, procedure, patient position, and availability of correct equipment and implants.    Indication: Dislocated Shoulder   Location: Left Shoulder  Anesthesia/Sedation: None  Procedure Detail: I manipulated the joint with Renan technique with Dr. Alex assistance   Immobilized with shoulder immobilizer  Post procedure assessment:  Gross deformity resolved , Neurovascular intact  and ROM improved   Patient Status: The patient tolerated the procedure well: Yes. There were no complications.      Emergency Department Course:       Reviewed:  I reviewed nursing notes, vitals and past medical history    Assessments:  0219 I obtained history and examined the patient as noted above.     0248 I performed a shoulder dislocation reduction, see procedure note above.     0353 I  rechecked the patient and explained findings.     Interventions:  Medications   HYDROmorphone (PF) (DILAUDID) injection 0.5 mg (0.5 mg Intramuscular Given 10/27/22 0242)   HYDROmorphone (PF) (DILAUDID) injection 0.5 mg (0.5 mg Intravenous Given 10/27/22 0305)        Disposition:  The patient was discharged to home.     Impression & Plan     CMS Diagnoses: None    Medical Decision Making:  Mykel Fishman is a 62 year old male who presents for evaluation of shoulder pain. This is consistent by clinical and radiological exam with a shoulder dislocation. The dislocation was successfully reduced and a shoulder immobilizer was placed by myself, the fit was checked.  The neurovascular exam is normal pre and post-reduction. Post reduction film with noted concerns for hill sachs deformity which was discussed with the patient. I will have patient stay in the immobilizer until follow-up with orthopedics in 3-5 days.  The patient was alert following sedation and able to tolerate po, is being discharged with a responsible adult to watch over the patient.      Diagnosis:    ICD-10-CM    1. Shoulder dislocation, left, initial encounter  S43.005A       2. Hill Sachs deformity, left  M21.822         Scribe Disclosure:  I, Becka Rosales, am serving as a scribe at 2:28 AM on 10/27/2022 to document services personally performed by Vicki Huerta DO based on my observations and the provider's statements to me.            Vicki Huerta DO  10/27/22 0456

## 2022-10-27 NOTE — ED TRIAGE NOTES
Patient moved in bed and felt his left shoulder dislocate. Has happened several times before.

## 2022-11-06 ENCOUNTER — MYC REFILL (OUTPATIENT)
Dept: CARDIOLOGY | Facility: CLINIC | Age: 62
End: 2022-11-06

## 2022-11-06 DIAGNOSIS — Q25.43 AORTIC ROOT ANEURYSM: ICD-10-CM

## 2022-11-06 DIAGNOSIS — I10 ESSENTIAL HYPERTENSION, BENIGN: ICD-10-CM

## 2022-11-07 RX ORDER — LISINOPRIL 5 MG/1
5 TABLET ORAL DAILY
Qty: 90 TABLET | Refills: 1 | Status: SHIPPED | OUTPATIENT
Start: 2022-11-07 | End: 2023-05-12

## 2022-11-07 RX ORDER — ATENOLOL 25 MG/1
25 TABLET ORAL DAILY
Qty: 90 TABLET | Refills: 1 | Status: SHIPPED | OUTPATIENT
Start: 2022-11-07 | End: 2023-05-12

## 2022-11-09 NOTE — TELEPHONE ENCOUNTER
" [facility] Call Center    Phone Message    May a detailed message be left on voicemail: yes     Reason for Call: Other: Per pt would like med refill not due until May 2023 on wait list as schedule is not out yet.    \"I was just really looking to get him to fill my atenolol and lisinopril because my previous family doctor but I was prescribed those for me for whatever reason and [facility] said they were having trouble with it that I was due for a physical, so yep definitely do that and hopefully my pills are available to \"    Action Taken: Message routed to:  Other: Cardiology    Travel Screening: Not Applicable                                                                        "

## 2022-11-30 LAB
AST SERPL-CCNC: 38 U/L (ref 5–34)
CREATININE (EXTERNAL): 0.93 MG/DL (ref 0.5–1.3)
GFR ESTIMATED (EXTERNAL): 87.5 ML/MIN/1.73M2

## 2023-04-01 ENCOUNTER — HEALTH MAINTENANCE LETTER (OUTPATIENT)
Age: 63
End: 2023-04-01

## 2023-05-10 ENCOUNTER — HOSPITAL ENCOUNTER (OUTPATIENT)
Dept: CARDIOLOGY | Facility: CLINIC | Age: 63
Discharge: HOME | End: 2023-05-10
Attending: INTERNAL MEDICINE | Admitting: INTERNAL MEDICINE
Payer: COMMERCIAL

## 2023-05-10 DIAGNOSIS — I10 ESSENTIAL HYPERTENSION, BENIGN: ICD-10-CM

## 2023-05-10 DIAGNOSIS — Q25.43 AORTIC ROOT ANEURYSM: ICD-10-CM

## 2023-05-10 LAB — LVEF ECHO: NORMAL

## 2023-05-10 PROCEDURE — 93306 TTE W/DOPPLER COMPLETE: CPT

## 2023-05-10 PROCEDURE — 93306 TTE W/DOPPLER COMPLETE: CPT | Mod: 26 | Performed by: INTERNAL MEDICINE

## 2023-05-12 ENCOUNTER — OFFICE VISIT (OUTPATIENT)
Dept: CARDIOLOGY | Facility: CLINIC | Age: 63
End: 2023-05-12
Payer: COMMERCIAL

## 2023-05-12 VITALS
DIASTOLIC BLOOD PRESSURE: 82 MMHG | HEIGHT: 67 IN | SYSTOLIC BLOOD PRESSURE: 160 MMHG | BODY MASS INDEX: 25.11 KG/M2 | WEIGHT: 160 LBS | HEART RATE: 79 BPM | OXYGEN SATURATION: 94 %

## 2023-05-12 DIAGNOSIS — Q25.43 AORTIC ROOT ANEURYSM: ICD-10-CM

## 2023-05-12 DIAGNOSIS — I10 ESSENTIAL HYPERTENSION, BENIGN: ICD-10-CM

## 2023-05-12 PROCEDURE — 99214 OFFICE O/P EST MOD 30 MIN: CPT | Performed by: INTERNAL MEDICINE

## 2023-05-12 RX ORDER — MIRTAZAPINE 15 MG/1
15 TABLET, FILM COATED ORAL AT BEDTIME
COMMUNITY
End: 2023-07-07

## 2023-05-12 RX ORDER — ATENOLOL 25 MG/1
25 TABLET ORAL DAILY
Qty: 90 TABLET | Refills: 3 | Status: SHIPPED | OUTPATIENT
Start: 2023-05-12 | End: 2024-05-07

## 2023-05-12 RX ORDER — LISINOPRIL 5 MG/1
5 TABLET ORAL 2 TIMES DAILY
Qty: 180 TABLET | Refills: 3 | Status: SHIPPED | OUTPATIENT
Start: 2023-05-12 | End: 2024-05-07

## 2023-05-12 NOTE — PROGRESS NOTES
"Cardiology Progress Note          Assessment and Plan:       1. Hypertension, suboptimal control in the setting of aortic root enlargement    Would like him to reduce salt in his diet    Increase lisinopril to 5 mg twice per day    Routine follow-up in 1 year      2. Stable aortic root enlargement 4.7 centimeters, similar measurement to 2020    Would like better blood pressure control.  Follow-up echocardiogram in 1 year    30 minutes was spent with the patient, precharting and reviewing tests as well as post visit charting all done today..    This note was transcribed using electronic voice recognition software and there may be typographical errors present.                    Interval History:     The patient is a very pleasant 62 year old whom I have been following for stable 4.7 cm aortic root enlargement.  His last echocardiogram was 5/10/2023.  He feels well without any dyspnea on exertion or exertional chest discomfort.  He had very salty dinner last night in his blood pressure is elevated today.  He is tolerating his medications well.                     Review of Systems:     Review of Systems:  Skin:  Negative     Eyes:  Negative    ENT:  Negative    Respiratory:  Negative    Cardiovascular:  Negative for;palpitations;chest pain;edema;fatigue;lightheadedness;dizziness    Gastroenterology: Negative    Genitourinary:  Negative    Musculoskeletal:  Positive for arthritis  Neurologic:  Negative    Psychiatric:  Negative    Heme/Lymph/Imm:  Negative    Endocrine:  Negative                Physical Exam:     Vitals: BP (!) 160/82   Pulse 79   Ht 1.702 m (5' 7\")   Wt 72.6 kg (160 lb)   SpO2 94%   BMI 25.06 kg/m    Constitutional:  cooperative, alert and oriented, well developed, well nourished, in no acute distress        Skin:  warm and dry to the touch, no apparent skin lesions or masses noted        Head:  normocephalic, no masses or lesions        Eyes:  pupils equal and round;conjunctivae and lids " unremarkable;sclera white;no xanthalasma;EOMS intact        Chest:  normal symmetry        Cardiac: normal S1 and S2;regular rhythm                  Extremities and Back:  no deformities, clubbing, cyanosis, erythema observed;no edema        Neurological:  no gross motor deficits;affect appropriate                 Medications:     Current Outpatient Medications   Medication Sig Dispense Refill     atenolol (TENORMIN) 25 MG tablet Take 1 tablet (25 mg) by mouth daily 90 tablet 3     folic acid (FOLVITE) 1 MG tablet TK 1 T PO QD  10     lisinopril (ZESTRIL) 5 MG tablet Take 1 tablet (5 mg) by mouth 2 times daily 180 tablet 3     methotrexate 2.5 MG tablet TK 5 TS PO Q WK       mirtazapine (REMERON) 15 MG tablet Take 15 mg by mouth At Bedtime       Multiple Vitamins-Minerals (MULTI COMPLETE PO) Take by mouth daily       sulfaSALAzine ER (AZULFIDINE EN) 500 MG EC tablet Take 1,500 mg by mouth 2 times daily  3                Data:   All laboratory data reviewed  No results found for this or any previous visit (from the past 24 hour(s)).    All laboratory data reviewed  Lab Results   Component Value Date    CHOL 128 07/06/2015     Lab Results   Component Value Date    HDL 46 07/06/2015     Lab Results   Component Value Date    LDL 71 07/06/2015     Lab Results   Component Value Date    TRIG 55 07/06/2015     Lab Results   Component Value Date    CHOLHDLRATIO 2.80 04/28/2014     No results found for: TSH  Last Basic Metabolic Panel:  Lab Results   Component Value Date     10/12/2015      Lab Results   Component Value Date    POTASSIUM 4.0 10/12/2015     Lab Results   Component Value Date    CHLORIDE 105 10/12/2015     Lab Results   Component Value Date    SURESH 9.3 10/12/2015     Lab Results   Component Value Date    CO2 22 10/12/2015     Lab Results   Component Value Date    BUN 20 10/12/2015     Lab Results   Component Value Date    CR 1.02 10/12/2015     Lab Results   Component Value Date     10/12/2015      Lab Results   Component Value Date    WBC 10.2 06/14/2015     Lab Results   Component Value Date    RBC 5.28 06/14/2015     Lab Results   Component Value Date    HGB 15.9 06/14/2015     Lab Results   Component Value Date    HCT 46.9 06/14/2015     Lab Results   Component Value Date    MCV 89 06/14/2015     Lab Results   Component Value Date    MCH 30.1 06/14/2015     Lab Results   Component Value Date    MCHC 33.9 06/14/2015     Lab Results   Component Value Date    RDW 13.0 06/14/2015     Lab Results   Component Value Date     06/14/2015

## 2023-05-12 NOTE — LETTER
"5/12/2023    Otto Ross MD  [place] [address]  [facility] [address]    RE: Mykel Fishman       Dear Colleague,     I had the pleasure of seeing Mykel Fishman in the [facility].  Cardiology Progress Note          Assessment and Plan:       Hypertension, suboptimal control in the setting of aortic root enlargement  Would like him to reduce salt in his diet  Increase lisinopril to 5 mg twice per day  Routine follow-up in 1 year      Stable aortic root enlargement 4.7 centimeters, similar measurement to 2020  Would like better blood pressure control.  Follow-up echocardiogram in 1 year    30 minutes was spent with the patient, precharting and reviewing tests as well as post visit charting all done today..    This note was transcribed using electronic voice recognition software and there may be typographical errors present.                    Interval History:     The patient is a very pleasant 62 year old whom I have been following for stable 4.7 cm aortic root enlargement.  His last echocardiogram was 5/10/2023.  He feels well without any dyspnea on exertion or exertional chest discomfort.  He had very salty dinner last night in his blood pressure is elevated today.  He is tolerating his medications well.                     Review of Systems:     Review of Systems:  Skin:  Negative     Eyes:  Negative    ENT:  Negative    Respiratory:  Negative    Cardiovascular:  Negative for;palpitations;chest pain;edema;fatigue;lightheadedness;dizziness    Gastroenterology: Negative    Genitourinary:  Negative    Musculoskeletal:  Positive for arthritis  Neurologic:  Negative    Psychiatric:  Negative    Heme/Lymph/Imm:  Negative    Endocrine:  Negative                Physical Exam:     Vitals: BP (!) 160/82   Pulse 79   Ht 1.702 m (5' 7\")   Wt 72.6 kg (160 lb)   SpO2 94%   BMI 25.06 kg/m    Constitutional:  cooperative, alert and oriented, well developed, well nourished, in no acute " distress        Skin:  warm and dry to the touch, no apparent skin lesions or masses noted        Head:  normocephalic, no masses or lesions        Eyes:  pupils equal and round;conjunctivae and lids unremarkable;sclera white;no xanthalasma;EOMS intact        Chest:  normal symmetry        Cardiac: normal S1 and S2;regular rhythm                  Extremities and Back:  no deformities, clubbing, cyanosis, erythema observed;no edema        Neurological:  no gross motor deficits;affect appropriate                 Medications:     Current Outpatient Medications   Medication Sig Dispense Refill    atenolol (TENORMIN) 25 MG tablet Take 1 tablet (25 mg) by mouth daily 90 tablet 3    folic acid (FOLVITE) 1 MG tablet TK 1 T PO QD  10    lisinopril (ZESTRIL) 5 MG tablet Take 1 tablet (5 mg) by mouth 2 times daily 180 tablet 3    methotrexate 2.5 MG tablet TK 5 TS PO Q WK      mirtazapine (REMERON) 15 MG tablet Take 15 mg by mouth At Bedtime      Multiple Vitamins-Minerals (MULTI COMPLETE PO) Take by mouth daily      sulfaSALAzine ER (AZULFIDINE EN) 500 MG EC tablet Take 1,500 mg by mouth 2 times daily  3                Data:   All laboratory data reviewed  No results found for this or any previous visit (from the past 24 hour(s)).    All laboratory data reviewed  Lab Results   Component Value Date    CHOL 128 07/06/2015     Lab Results   Component Value Date    HDL 46 07/06/2015     Lab Results   Component Value Date    LDL 71 07/06/2015     Lab Results   Component Value Date    TRIG 55 07/06/2015     Lab Results   Component Value Date    CHOLHDLRATIO 2.80 04/28/2014     No results found for: TSH  Last Basic Metabolic Panel:  Lab Results   Component Value Date     10/12/2015      Lab Results   Component Value Date    POTASSIUM 4.0 10/12/2015     Lab Results   Component Value Date    CHLORIDE 105 10/12/2015     Lab Results   Component Value Date    SURESH 9.3 10/12/2015     Lab Results   Component Value Date    CO2 22  10/12/2015     Lab Results   Component Value Date    BUN 20 10/12/2015     Lab Results   Component Value Date    CR 1.02 10/12/2015     Lab Results   Component Value Date     10/12/2015     Lab Results   Component Value Date    WBC 10.2 06/14/2015     Lab Results   Component Value Date    RBC 5.28 06/14/2015     Lab Results   Component Value Date    HGB 15.9 06/14/2015     Lab Results   Component Value Date    HCT 46.9 06/14/2015     Lab Results   Component Value Date    MCV 89 06/14/2015     Lab Results   Component Value Date    MCH 30.1 06/14/2015     Lab Results   Component Value Date    MCHC 33.9 06/14/2015     Lab Results   Component Value Date    RDW 13.0 06/14/2015     Lab Results   Component Value Date     06/14/2015                   Thank you for allowing me to participate in the care of your patient.      Sincerely,     Ethan Henry MD     [facility]  cc:   Ethan Henry MD  [address]  [place] [address]

## 2023-06-30 ASSESSMENT — ENCOUNTER SYMPTOMS
HEMATURIA: 0
EYE PAIN: 0
DIARRHEA: 0
DIZZINESS: 0
CHILLS: 0
PALPITATIONS: 0
NAUSEA: 0
PARESTHESIAS: 0
DYSURIA: 0
FEVER: 0
HEARTBURN: 0
ARTHRALGIAS: 0
HEMATOCHEZIA: 0
CONSTIPATION: 0
FREQUENCY: 0
HEADACHES: 0
SHORTNESS OF BREATH: 0
SORE THROAT: 0
JOINT SWELLING: 0
WEAKNESS: 0
NERVOUS/ANXIOUS: 0
COUGH: 0
MYALGIAS: 0
ABDOMINAL PAIN: 0

## 2023-07-07 ENCOUNTER — OFFICE VISIT (OUTPATIENT)
Dept: INTERNAL MEDICINE | Facility: CLINIC | Age: 63
End: 2023-07-07
Payer: COMMERCIAL

## 2023-07-07 VITALS
DIASTOLIC BLOOD PRESSURE: 62 MMHG | SYSTOLIC BLOOD PRESSURE: 122 MMHG | WEIGHT: 161.9 LBS | HEIGHT: 67 IN | BODY MASS INDEX: 25.41 KG/M2 | RESPIRATION RATE: 18 BRPM | OXYGEN SATURATION: 98 % | HEART RATE: 60 BPM

## 2023-07-07 DIAGNOSIS — Z00.00 ROUTINE GENERAL MEDICAL EXAMINATION AT A HEALTH CARE FACILITY: Primary | ICD-10-CM

## 2023-07-07 DIAGNOSIS — Z11.4 SCREENING FOR HIV (HUMAN IMMUNODEFICIENCY VIRUS): ICD-10-CM

## 2023-07-07 DIAGNOSIS — Z12.5 PROSTATE CANCER SCREENING: ICD-10-CM

## 2023-07-07 DIAGNOSIS — Z12.11 SCREEN FOR COLON CANCER: ICD-10-CM

## 2023-07-07 DIAGNOSIS — Q25.43 AORTIC ROOT ANEURYSM: ICD-10-CM

## 2023-07-07 DIAGNOSIS — Z13.220 LIPID SCREENING: ICD-10-CM

## 2023-07-07 DIAGNOSIS — F51.04 PSYCHOPHYSIOLOGICAL INSOMNIA: ICD-10-CM

## 2023-07-07 DIAGNOSIS — Z11.59 NEED FOR HEPATITIS C SCREENING TEST: ICD-10-CM

## 2023-07-07 DIAGNOSIS — I10 BENIGN ESSENTIAL HYPERTENSION: ICD-10-CM

## 2023-07-07 DIAGNOSIS — L30.9 DERMATITIS: ICD-10-CM

## 2023-07-07 DIAGNOSIS — I35.1 NONRHEUMATIC AORTIC VALVE INSUFFICIENCY: ICD-10-CM

## 2023-07-07 DIAGNOSIS — M06.4 INFLAMMATORY POLYARTHROPATHY (H): ICD-10-CM

## 2023-07-07 PROBLEM — T45.1X5A: Status: ACTIVE | Noted: 2021-09-07

## 2023-07-07 PROBLEM — B02.9 HERPES ZOSTER: Status: ACTIVE | Noted: 2023-07-07

## 2023-07-07 PROBLEM — K76.0: Status: ACTIVE | Noted: 2021-09-07

## 2023-07-07 PROCEDURE — 99396 PREV VISIT EST AGE 40-64: CPT | Mod: 25 | Performed by: INTERNAL MEDICINE

## 2023-07-07 PROCEDURE — 90471 IMMUNIZATION ADMIN: CPT | Performed by: INTERNAL MEDICINE

## 2023-07-07 PROCEDURE — 90472 IMMUNIZATION ADMIN EACH ADD: CPT | Performed by: INTERNAL MEDICINE

## 2023-07-07 PROCEDURE — 99214 OFFICE O/P EST MOD 30 MIN: CPT | Mod: 25 | Performed by: INTERNAL MEDICINE

## 2023-07-07 PROCEDURE — 90715 TDAP VACCINE 7 YRS/> IM: CPT | Performed by: INTERNAL MEDICINE

## 2023-07-07 PROCEDURE — 90677 PCV20 VACCINE IM: CPT | Performed by: INTERNAL MEDICINE

## 2023-07-07 RX ORDER — MIRTAZAPINE 7.5 MG/1
TABLET, FILM COATED ORAL
COMMUNITY
Start: 2023-03-02 | End: 2023-07-07

## 2023-07-07 RX ORDER — FLUOCINONIDE 0.5 MG/G
CREAM TOPICAL
COMMUNITY
Start: 2022-09-17 | End: 2023-07-07

## 2023-07-07 RX ORDER — FLUOCINONIDE 0.5 MG/G
CREAM TOPICAL
Qty: 60 G | Refills: 1 | Status: SHIPPED | OUTPATIENT
Start: 2023-07-07 | End: 2024-07-03

## 2023-07-07 RX ORDER — MIRTAZAPINE 7.5 MG/1
7.5 TABLET, FILM COATED ORAL AT BEDTIME
Qty: 90 TABLET | Refills: 3 | Status: SHIPPED | OUTPATIENT
Start: 2023-07-07 | End: 2024-07-03

## 2023-07-07 ASSESSMENT — ANXIETY QUESTIONNAIRES
6. BECOMING EASILY ANNOYED OR IRRITABLE: NOT AT ALL
GAD7 TOTAL SCORE: 1
7. FEELING AFRAID AS IF SOMETHING AWFUL MIGHT HAPPEN: NOT AT ALL
2. NOT BEING ABLE TO STOP OR CONTROL WORRYING: NOT AT ALL
3. WORRYING TOO MUCH ABOUT DIFFERENT THINGS: NOT AT ALL
5. BEING SO RESTLESS THAT IT IS HARD TO SIT STILL: NOT AT ALL
GAD7 TOTAL SCORE: 1
IF YOU CHECKED OFF ANY PROBLEMS ON THIS QUESTIONNAIRE, HOW DIFFICULT HAVE THESE PROBLEMS MADE IT FOR YOU TO DO YOUR WORK, TAKE CARE OF THINGS AT HOME, OR GET ALONG WITH OTHER PEOPLE: NOT DIFFICULT AT ALL
1. FEELING NERVOUS, ANXIOUS, OR ON EDGE: NOT AT ALL

## 2023-07-07 ASSESSMENT — ENCOUNTER SYMPTOMS
NAUSEA: 0
FEVER: 0
EYE PAIN: 0
DIZZINESS: 0
ARTHRALGIAS: 0
CONSTIPATION: 0
PARESTHESIAS: 0
COUGH: 0
SORE THROAT: 0
HEMATOCHEZIA: 0
DYSURIA: 0
HEARTBURN: 0
HEADACHES: 0
SHORTNESS OF BREATH: 0
MYALGIAS: 0
PALPITATIONS: 0
DIARRHEA: 0
ABDOMINAL PAIN: 0
FREQUENCY: 0
NERVOUS/ANXIOUS: 0
JOINT SWELLING: 0
CHILLS: 0
HEMATURIA: 0
WEAKNESS: 0

## 2023-07-07 ASSESSMENT — PATIENT HEALTH QUESTIONNAIRE - PHQ9
5. POOR APPETITE OR OVEREATING: SEVERAL DAYS
SUM OF ALL RESPONSES TO PHQ QUESTIONS 1-9: 1

## 2023-07-07 NOTE — PROGRESS NOTES
SUBJECTIVE:   CC: Mykel is an 62 year old who presents for preventative health visit.        No data to display              HPI    Today's PHQ-2 Score:       2023     9:24 AM   PHQ-2 (  [employer])   Q1: Little interest or pleasure in doing things 0   Q2: Feeling down, depressed or hopeless 0   PHQ-2 Score 0   Q1: Little interest or pleasure in doing things Not at all   Q2: Feeling down, depressed or hopeless Not at all   PHQ-2 Score 0                       Social History     Tobacco Use     Smoking status: Former     Packs/day: 1.00     Years: 10.00     Pack years: 10.00     Types: Cigarettes     Start date: 1990     Quit date: 2005     Years since quittin.5     Smokeless tobacco: Never   Substance Use Topics     Alcohol use: Yes     Comment: 2-3 drinks per week             2023     9:50 AM   Alcohol Use   Prescreen: >3 drinks/day or >7 drinks/week? No       Last PSA: No results found for: PSA    Reviewed orders with patient. Reviewed health maintenance and updated orders accordingly - Yes  Labs reviewed in EPIC    Reviewed and updated as needed this visit by clinical staff   Tobacco  Allergies  Meds      Soc Hx        Reviewed and updated as needed this visit by Provider   Tobacco        Soc Hx           Review of Systems   Constitutional: Negative for chills and fever.   HENT: Negative for congestion, ear pain, hearing loss and sore throat.    Eyes: Negative for pain and visual disturbance.   Respiratory: Negative for cough and shortness of breath.    Cardiovascular: Negative for chest pain, palpitations and peripheral edema.   Gastrointestinal: Negative for abdominal pain, constipation, diarrhea, heartburn, hematochezia and nausea.   Genitourinary: Negative for dysuria, frequency, genital sores, hematuria, impotence, penile discharge and urgency.   Musculoskeletal: Negative for arthralgias, joint swelling and myalgias.   Skin: Negative for rash.   Neurological: Negative for dizziness,  "weakness, headaches and paresthesias.   Psychiatric/Behavioral: Negative for mood changes. The patient is not nervous/anxious.      OBJECTIVE:   /62   Pulse 60   Resp 18   Ht 1.702 m (5' 7\")   Wt 73.4 kg (161 lb 14.4 oz)   SpO2 98%   BMI 25.36 kg/m      Physical Exam  GENERAL: healthy, alert and no distress  EYES: Eyes grossly normal to inspection, PERRL and conjunctivae and sclerae normal  HENT: ear canals and TM's normal, nose and mouth without ulcers or lesions  NECK: no adenopathy, no asymmetry, masses, or scars and thyroid normal to palpation  RESP: lungs clear to auscultation - no rales, rhonchi or wheezes  CV: regular rates and rhythm, normal S1 S2, no S3 or S4, grade 2/6 diastolic murmur heard best over the LSB, peripheral pulses strong and no peripheral edema  ABDOMEN: soft, nontender, no hepatosplenomegaly, no masses and bowel sounds normal  MS: no gross musculoskeletal defects noted, no edema  SKIN: no suspicious lesions or rashes  NEURO: Normal strength and tone, mentation intact and speech normal  PSYCH: mentation appears normal, affect normal/bright  LYMPH: no cervical, supraclavicular, axillary, or inguinal adenopathy    Labs reviewed in Epic    ASSESSMENT/PLAN:       ICD-10-CM    1. Routine general medical examination at a health care facility  Z00.00       2. Benign essential hypertension  I10       3. Nonrheumatic aortic valve insufficiency  I35.1       4. Dermatitis  L30.9 fluocinonide (LIDEX) 0.05 % external cream      5. Psychophysiological insomnia  F51.04 mirtazapine (REMERON) 7.5 MG tablet      6. Inflammatory polyarthropathy (H)  M06.4       7. Aortic root aneurysm (H)  I71.21       8. Lipid screening  Z13.220 Lipid panel reflex to direct LDL Non-fasting      9. Screen for colon cancer  Z12.11       10. Prostate cancer screening  Z12.5 PSA, screen      11. Screening for HIV (human immunodeficiency virus)  Z11.4 HIV Antigen Antibody Combo      12. Need for hepatitis C screening " "test  Z11.59 Hepatitis C Screen Reflex to HCV RNA Quant and Genotype        -Updated screening, immunizations, prevention.  Please see health maintenance list, care gaps  -Continue atenolol and lisinopril for his BP as well as aortic root measurements.  -10-year estimated CV risk is approximately 8% he does have a strong family history with mother and brother both having coronary artery disease and he has inflammatory polyarthritis plus ulcerative colitis both of which might increase his cardiovascular risk as well.  Elevated homocystine levels too.  from a primary prevention standpoint might be useful to get a coronary calcium CT to see if we are underestimating his risk.  Otherwise for now continue to focus on his BP.   -Uses mirtazapine more for sleep then depression.  He states he was placed on this after going through a divorce in which she was having difficulty sleeping.  He denies any depression but states it is fairly effective with sleep maintenance.  No side effects from using this so go ahead and continue.  -Colonoscopy due  according to most recent records and care everywhere.    Patient has been advised of split billing requirements and indicates understanding: Yes      COUNSELING:   Reviewed preventive health counseling, as reflected in patient instructions       Regular exercise       Healthy diet/nutrition       HIV screeninx in teen years, 1x in adult years, and at intervals if high risk       Colorectal cancer screening       Prostate cancer screening      BMI:   Estimated body mass index is 25.36 kg/m  as calculated from the following:    Height as of this encounter: 1.702 m (5' 7\").    Weight as of this encounter: 73.4 kg (161 lb 14.4 oz).         He reports that he quit smoking about 18 years ago. His smoking use included cigarettes. He started smoking about 33 years ago. He has a 10.00 pack-year smoking history. He has never used smokeless tobacco.            Kumar Abreu MD  M " [facility]

## 2023-07-11 ENCOUNTER — TRANSFERRED RECORDS (OUTPATIENT)
Dept: HEALTH INFORMATION MANAGEMENT | Facility: CLINIC | Age: 63
End: 2023-07-11
Payer: COMMERCIAL

## 2023-07-11 LAB
ALT SERPL-CCNC: 44 IU/L (ref 5–40)
AST SERPL-CCNC: 39 U/L (ref 5–34)
CREATININE (EXTERNAL): 0.76 MG/DL (ref 0.5–1.3)
GFR ESTIMATED (EXTERNAL): 110.8 ML/MIN/1.73M2

## 2023-07-22 ENCOUNTER — LAB (OUTPATIENT)
Dept: LAB | Facility: CLINIC | Age: 63
End: 2023-07-22
Attending: INTERNAL MEDICINE
Payer: COMMERCIAL

## 2023-07-22 DIAGNOSIS — Z12.5 PROSTATE CANCER SCREENING: ICD-10-CM

## 2023-07-22 DIAGNOSIS — Z11.59 NEED FOR HEPATITIS C SCREENING TEST: ICD-10-CM

## 2023-07-22 DIAGNOSIS — Z13.220 LIPID SCREENING: ICD-10-CM

## 2023-07-22 DIAGNOSIS — Z11.4 SCREENING FOR HIV (HUMAN IMMUNODEFICIENCY VIRUS): ICD-10-CM

## 2023-07-22 LAB
CHOLEST SERPL-MCNC: 138 MG/DL
HDLC SERPL-MCNC: 51 MG/DL
LDLC SERPL CALC-MCNC: 79 MG/DL
NONHDLC SERPL-MCNC: 87 MG/DL
PSA SERPL DL<=0.01 NG/ML-MCNC: 1.8 NG/ML (ref 0–4.5)
TRIGL SERPL-MCNC: 42 MG/DL

## 2023-07-22 PROCEDURE — 36415 COLL VENOUS BLD VENIPUNCTURE: CPT

## 2023-07-22 PROCEDURE — G0103 PSA SCREENING: HCPCS

## 2023-07-22 PROCEDURE — 87389 HIV-1 AG W/HIV-1&-2 AB AG IA: CPT

## 2023-07-22 PROCEDURE — 86803 HEPATITIS C AB TEST: CPT

## 2023-07-22 PROCEDURE — 80061 LIPID PANEL: CPT

## 2023-07-24 LAB
HCV AB SERPL QL IA: NONREACTIVE
HIV 1+2 AB+HIV1 P24 AG SERPL QL IA: NONREACTIVE

## 2023-08-01 ENCOUNTER — TRANSFERRED RECORDS (OUTPATIENT)
Dept: HEALTH INFORMATION MANAGEMENT | Facility: CLINIC | Age: 63
End: 2023-08-01
Payer: COMMERCIAL

## 2023-08-01 LAB
ALT SERPL-CCNC: 32 IU/L (ref 0–44)
AST SERPL-CCNC: 29 IU/L (ref 0–40)
CREATININE (EXTERNAL): 0.83 MG/DL (ref 0.76–1.27)
GFR ESTIMATED (EXTERNAL): 100 ML/MIN/1.73

## 2023-10-12 ENCOUNTER — TRANSFERRED RECORDS (OUTPATIENT)
Dept: HEALTH INFORMATION MANAGEMENT | Facility: CLINIC | Age: 63
End: 2023-10-12
Payer: COMMERCIAL

## 2023-11-30 ENCOUNTER — TRANSFERRED RECORDS (OUTPATIENT)
Dept: HEALTH INFORMATION MANAGEMENT | Facility: CLINIC | Age: 63
End: 2023-11-30
Payer: COMMERCIAL

## 2023-11-30 LAB — ALT SERPL-CCNC: 56 IU/L (ref 5–40)

## 2024-01-16 ENCOUNTER — TRANSFERRED RECORDS (OUTPATIENT)
Dept: HEALTH INFORMATION MANAGEMENT | Facility: CLINIC | Age: 64
End: 2024-01-16
Payer: COMMERCIAL

## 2024-01-16 LAB
ALT SERPL-CCNC: 58 IU/L (ref 0–44)
AST SERPL-CCNC: 43 IU/L (ref 0–40)
CREATININE (EXTERNAL): 1.09 MG/DL (ref 0.76–1.27)
GFR ESTIMATED (EXTERNAL): 77 ML/MIN/1.73

## 2024-03-27 ENCOUNTER — TRANSFERRED RECORDS (OUTPATIENT)
Dept: HEALTH INFORMATION MANAGEMENT | Facility: CLINIC | Age: 64
End: 2024-03-27
Payer: COMMERCIAL

## 2024-03-27 LAB
ALT SERPL-CCNC: 82 IU/L (ref 5–40)
AST SERPL-CCNC: 65 U/L (ref 5–34)
CREATININE (EXTERNAL): 0.88 MG/DL (ref 0.5–1.3)

## 2024-04-26 ENCOUNTER — TRANSFERRED RECORDS (OUTPATIENT)
Dept: HEALTH INFORMATION MANAGEMENT | Facility: CLINIC | Age: 64
End: 2024-04-26
Payer: COMMERCIAL

## 2024-05-07 DIAGNOSIS — Q25.43 AORTIC ROOT ANEURYSM: ICD-10-CM

## 2024-05-07 DIAGNOSIS — I10 ESSENTIAL HYPERTENSION, BENIGN: ICD-10-CM

## 2024-05-07 RX ORDER — LISINOPRIL 5 MG/1
5 TABLET ORAL 2 TIMES DAILY
Qty: 180 TABLET | Refills: 1 | Status: SHIPPED | OUTPATIENT
Start: 2024-05-07 | End: 2024-06-14

## 2024-05-07 RX ORDER — ATENOLOL 25 MG/1
25 TABLET ORAL DAILY
Qty: 90 TABLET | Refills: 0 | Status: SHIPPED | OUTPATIENT
Start: 2024-05-07 | End: 2024-06-14

## 2024-05-07 NOTE — LETTER
May 7, 2024       TO: Mykel Fishman  [address] [facility] [address]       Dear Mykel Fishman,    We recently received a call from your pharmacy requesting a refill of your medication(s).    Our records indicate that you are due for follow-up with your Heart Care Provider. We will refill your medications for 3 months which will allow you enough time to be seen.    Please call [phone] to schedule your appointment.    Thank you for allowing [facility] to be a part of your health care team and we look forward to seeing you soon.    Thank you,    [facility]

## 2024-06-13 ENCOUNTER — HOSPITAL ENCOUNTER (OUTPATIENT)
Dept: CARDIOLOGY | Facility: CLINIC | Age: 64
Discharge: HOME | End: 2024-06-13
Attending: INTERNAL MEDICINE | Admitting: INTERNAL MEDICINE
Payer: COMMERCIAL

## 2024-06-13 DIAGNOSIS — I10 ESSENTIAL HYPERTENSION, BENIGN: ICD-10-CM

## 2024-06-13 DIAGNOSIS — Q25.43 AORTIC ROOT ANEURYSM: ICD-10-CM

## 2024-06-13 LAB — LVEF ECHO: NORMAL

## 2024-06-13 PROCEDURE — 93306 TTE W/DOPPLER COMPLETE: CPT

## 2024-06-13 PROCEDURE — 93306 TTE W/DOPPLER COMPLETE: CPT | Mod: 26 | Performed by: INTERNAL MEDICINE

## 2024-06-13 NOTE — PROGRESS NOTES
~Cardiology Clinic Visit~    Primary Cardiologist: Dr. Henry  Reason for visit: Annual follow up    History of Present Illness    Mykel Fishman is a very pleasant 63 year old male with a past medical history notable for aortic root enlargement, hypertension suboptimally controlled.    Patient has followed with us in cardiology clinic dating back to 2014.  He was a prior patient of Dr. Whittaker, and now follows with Dr. Henry.  He has been followed for stable 4.7 cm aortic root enlargement.  At his last follow-up 1 year ago, patient felt well without any dyspnea on exertion or exertional chest discomfort.  At that visit, he noted had a very salty dinner the night prior and his blood pressure was elevated at the clinic visit.  He is tolerating his medication regimen well.    His current hypertensive regimen consist of atenolol 25 mg daily, lisinopril 5 mg twice daily.  Renal function has remained stable on BMP.  His lipid profile is well-controlled, he is not currently on statin therapy.    Diagnostics:  6/13/24 Echocardiogram: Normal LV size with normal LV wall thickness and EF 60 to 65%.  Early diastolic function present.  No regional wall motion abnormalities.  2+ AR.  No aortic stenosis.  Aortic root aneurysm noted at 4.7 cm, ascending aortic dilation present at 4.2 cm.  Compared to study from 2023, aortic root measured 4.7 cm, ascending aorta measured 3.8 cm.    Interval 06/14/24    Patient is doing quite well today.  No acute concerns. BP remains elevated, 143/79.  Was rechecked a few times today with similar readings.  He notes when he checks at home his systolic is around the 130s.  Testing results reviewed in detail.  __________________________________________________________________         Assessment and Impression:     Hypertension, suboptimal control in the setting of aortic root enlargement  Would like him to reduce salt in his diet  On lisinopril to 5 mg twice per day  Routine follow-up in 1  year     Stable aortic root enlargement 4.7 centimeters, similar measurement to 2020  Would like better blood pressure control.  Follow-up echocardiogram in 1 year         Recommendations and Plan:     Stop lisinopril, and start Losartan 25 mg twice daily.  Trial atorvastatin 10 mg daily for aortic protection.  If ALT elevates further, will discontinue.  Labs in 2-weeks (BMP, ALT).  [facility] telephone BP follow up in 6-weeks.  At that visit, assess medication tolerance and likely titrate up to maximum tolerated dose to optimize BP given aortic root and ascending aorta dilation.  Anticipate yearly echo with routine, yearly visit with Dr. Henry.  __________________________________________________________________    Thank you for the opportunity to participate in this pleasant patient's care.    We would be happy to see this patient sooner for any concerns in the meantime.    NORBERTO White, CNP   Nurse Practitioner  [facility]    Today's clinic visit entailed:  The following tests were independently interpreted by me as noted in my documentation: echo, labs  Ordering of each unique test  Prescription drug management  The level of medical decision making during this visit was of moderate complexity.  Review of the result(s) of each unique test - cardiac testing, cardiac imaging, labs  Care everywhere reviewed for additional records to facilitate comprehensive patient care.    Orders this Visit:  Orders Placed This Encounter   Procedures    Basic metabolic panel    ALT    Follow-Up with Cardiology- [facility]    Follow-Up with Cardiology    Echocardiogram Complete     Orders Placed This Encounter   Medications    adalimumab (HUMIRA) 40 MG/0.8ML prefilled syringe kit     Sig: Inject 40 mg Subcutaneous every 14 days    atenolol (TENORMIN) 25 MG tablet     Sig: Take 1 tablet (25 mg) by mouth daily     Dispense:  90 tablet     Refill:  3    losartan (COZAAR) 25 MG tablet     Sig: Take 1 tablet (25 mg) by  mouth 2 times daily     Dispense:  180 tablet     Refill:  4    atorvastatin (LIPITOR) 10 MG tablet     Sig: Take 1 tablet (10 mg) by mouth daily     Dispense:  90 tablet     Refill:  3     Medications Discontinued During This Encounter   Medication Reason    lisinopril (ZESTRIL) 5 MG tablet     atenolol (TENORMIN) 25 MG tablet Reorder (No AVS)     Encounter Diagnoses   Name Primary?    Aortic root aneurysm (H24) Yes    Essential hypertension, benign     Hyperlipidemia LDL goal <70     Ascending aorta dilatation (H24)      CURRENT MEDICATIONS:  Current Outpatient Medications   Medication Sig Dispense Refill    adalimumab (HUMIRA) 40 MG/0.8ML prefilled syringe kit Inject 40 mg Subcutaneous every 14 days      atenolol (TENORMIN) 25 MG tablet Take 1 tablet (25 mg) by mouth daily 90 tablet 3    atorvastatin (LIPITOR) 10 MG tablet Take 1 tablet (10 mg) by mouth daily 90 tablet 3    fluocinonide (LIDEX) 0.05 % external cream APPLY TOPICALLY TO THE AFFECTED AREA TWICE DAILY UNTIL GONE THEN USE AS NEEDED 60 g 1    folic acid (FOLVITE) 1 MG tablet TK 1 T PO QD  10    losartan (COZAAR) 25 MG tablet Take 1 tablet (25 mg) by mouth 2 times daily 180 tablet 4    mirtazapine (REMERON) 7.5 MG tablet Take 1 tablet (7.5 mg) by mouth At Bedtime 90 tablet 3    Multiple Vitamins-Minerals (MULTI COMPLETE PO) Take by mouth daily      sulfaSALAzine ER (AZULFIDINE EN) 500 MG EC tablet Take 1,500 mg by mouth 2 times daily  3    methotrexate 2.5 MG tablet Take 3 tablets (7.5 mg) by mouth every 7 days (Patient not taking: Reported on 6/14/2024)       ALLERGIES     Allergies   Allergen Reactions    Lidocaine Other (See Comments)     PN: as infant was given PCN and novicaine and went into cardiac arrest  PN: as infant was given PCN and novicaine and went into cardiac arrest      No Clinical Screening - See Comments      NOVACAINE AND LOCAL ANSETHETICS    Penicillins      PAST MEDICAL, SURGICAL, FAMILY, SOCIAL HISTORY:  History was reviewed and  "updated as needed, see medical record.    Review of Systems:  A 10-point Review Of Systems is otherwise normal except for that which is noted in the HPI and interval summary.    Physical Exam:    Vitals: BP (!) 143/79   Pulse 66   Ht 1.702 m (5' 7\")   Wt 76.1 kg (167 lb 11.2 oz)   BMI 26.27 kg/m    Constitutional: Appears stated age, well nourished, NAD.  Neck: Supple. Carotid pulses full and equal.   Respiratory: Non-labored. Lungs CTAB.  Cardiovascular: RRR, normal S1 and S2. No M/G/R.  No edema.  GI: Soft, non-distended, non-tender.  Skin: Warm and dry.   Musculoskeletal/Extremities: Symmetrical movement.  Neurologic: No gross focal deficits. Alert, awake.  Psychiatric: Affect appropriate. Mentation normal.    Recent Lab Results:  LIPID RESULTS:  Lab Results   Component Value Date    CHOL 138 07/22/2023    CHOL 128 07/06/2015    HDL 51 07/22/2023    HDL 46 07/06/2015    LDL 79 07/22/2023    LDL 71 07/06/2015    TRIG 42 07/22/2023    TRIG 55 07/06/2015    CHOLHDLRATIO 2.80 04/28/2014     LIVER ENZYME RESULTS:  No results found for: \"AST\", \"ALT\"  CBC RESULTS:  Lab Results   Component Value Date    WBC 10.2 06/14/2015    RBC 5.28 06/14/2015    HGB 15.9 06/14/2015    HCT 46.9 06/14/2015    MCV 89 06/14/2015    MCH 30.1 06/14/2015    MCHC 33.9 06/14/2015    RDW 13.0 06/14/2015     06/14/2015     BMP RESULTS:  Lab Results   Component Value Date     10/12/2015    POTASSIUM 4.0 10/12/2015    CHLORIDE 105 10/12/2015    CO2 22 (L) 10/12/2015    ANIONGAP 14 10/12/2015     (H) 10/12/2015    BUN 20 10/12/2015    CR 1.02 10/12/2015    GFRESTIMATED 81 10/12/2015    GFRESTBLACK >90 10/12/2015    SURESH 9.3 10/12/2015      A1C RESULTS:  No results found for: \"A1C\"  INR RESULTS:  No results found for: \"INR\"                  "

## 2024-06-14 ENCOUNTER — OFFICE VISIT (OUTPATIENT)
Dept: CARDIOLOGY | Facility: CLINIC | Age: 64
End: 2024-06-14
Attending: INTERNAL MEDICINE
Payer: COMMERCIAL

## 2024-06-14 VITALS
DIASTOLIC BLOOD PRESSURE: 79 MMHG | HEIGHT: 67 IN | SYSTOLIC BLOOD PRESSURE: 143 MMHG | BODY MASS INDEX: 26.32 KG/M2 | WEIGHT: 167.7 LBS | HEART RATE: 66 BPM

## 2024-06-14 DIAGNOSIS — I10 ESSENTIAL HYPERTENSION, BENIGN: ICD-10-CM

## 2024-06-14 DIAGNOSIS — E78.5 HYPERLIPIDEMIA LDL GOAL <70: ICD-10-CM

## 2024-06-14 DIAGNOSIS — I77.810 ASCENDING AORTA DILATATION (H): ICD-10-CM

## 2024-06-14 DIAGNOSIS — K51.00 ULCERATIVE PANCOLITIS WITHOUT COMPLICATION (H): ICD-10-CM

## 2024-06-14 DIAGNOSIS — Q25.43 AORTIC ROOT ANEURYSM: Primary | ICD-10-CM

## 2024-06-14 DIAGNOSIS — M06.4 INFLAMMATORY POLYARTHROPATHY (H): ICD-10-CM

## 2024-06-14 PROCEDURE — 99214 OFFICE O/P EST MOD 30 MIN: CPT | Performed by: NURSE PRACTITIONER

## 2024-06-14 RX ORDER — ATORVASTATIN CALCIUM 10 MG/1
10 TABLET, FILM COATED ORAL DAILY
Qty: 90 TABLET | Refills: 3 | Status: SHIPPED | OUTPATIENT
Start: 2024-06-14

## 2024-06-14 RX ORDER — LOSARTAN POTASSIUM 25 MG/1
25 TABLET ORAL 2 TIMES DAILY
Qty: 180 TABLET | Refills: 4 | Status: SHIPPED | OUTPATIENT
Start: 2024-06-14 | End: 2024-07-03

## 2024-06-14 RX ORDER — LISINOPRIL 5 MG/1
5 TABLET ORAL 2 TIMES DAILY
Qty: 180 TABLET | Refills: 3 | Status: CANCELLED | OUTPATIENT
Start: 2024-06-14

## 2024-06-14 RX ORDER — ATENOLOL 25 MG/1
25 TABLET ORAL DAILY
Qty: 90 TABLET | Refills: 3 | Status: SHIPPED | OUTPATIENT
Start: 2024-06-14

## 2024-06-14 NOTE — LETTER
6/14/2024    Otto Ross MD  Xxx Retired Sept 2023 Xxx    RE: Mykel Fishman       Dear Colleague,     I had the pleasure of seeing Mykel Fishman in the [facility].              ~Cardiology Clinic Visit~    Primary Cardiologist: Dr. Henry  Reason for visit: Annual follow up    History of Present Illness    Mykel Fishman is a very pleasant 63 year old male with a past medical history notable for aortic root enlargement, hypertension suboptimally controlled.    Patient has followed with us in cardiology clinic dating back to 2014.  He was a prior patient of Dr. Whittaker, and now follows with Dr. Henry.  He has been followed for stable 4.7 cm aortic root enlargement.  At his last follow-up 1 year ago, patient felt well without any dyspnea on exertion or exertional chest discomfort.  At that visit, he noted had a very salty dinner the night prior and his blood pressure was elevated at the clinic visit.  He is tolerating his medication regimen well.    His current hypertensive regimen consist of atenolol 25 mg daily, lisinopril 5 mg twice daily.  Renal function has remained stable on BMP.  His lipid profile is well-controlled, he is not currently on statin therapy.    Diagnostics:  6/13/24 Echocardiogram: Normal LV size with normal LV wall thickness and EF 60 to 65%.  Early diastolic function present.  No regional wall motion abnormalities.  2+ AR.  No aortic stenosis.  Aortic root aneurysm noted at 4.7 cm, ascending aortic dilation present at 4.2 cm.  Compared to study from 2023, aortic root measured 4.7 cm, ascending aorta measured 3.8 cm.    Interval 06/14/24    Patient is doing quite well today.  No acute concerns. BP remains elevated, 143/79.  Was rechecked a few times today with similar readings.  He notes when he checks at home his systolic is around the 130s.  Testing results reviewed in detail.  __________________________________________________________________         Assessment and  Impression:     Hypertension, suboptimal control in the setting of aortic root enlargement  Would like him to reduce salt in his diet  On lisinopril to 5 mg twice per day  Routine follow-up in 1 year     Stable aortic root enlargement 4.7 centimeters, similar measurement to 2020  Would like better blood pressure control.  Follow-up echocardiogram in 1 year         Recommendations and Plan:     Stop lisinopril, and start Losartan 25 mg twice daily.  Trial atorvastatin 10 mg daily for aortic protection.  If ALT elevates further, will discontinue.  Labs in 2-weeks (BMP, ALT).  [facility] telephone BP follow up in 6-weeks.  At that visit, assess medication tolerance and likely titrate up to maximum tolerated dose to optimize BP given aortic root and ascending aorta dilation.  Anticipate yearly echo with routine, yearly visit with Dr. Henry.  __________________________________________________________________    Thank you for the opportunity to participate in this pleasant patient's care.    We would be happy to see this patient sooner for any concerns in the meantime.    NORBERTO White, CNP   Nurse Practitioner  [facility]    Today's clinic visit entailed:  The following tests were independently interpreted by me as noted in my documentation: echo, labs  Ordering of each unique test  Prescription drug management  The level of medical decision making during this visit was of moderate complexity.  Review of the result(s) of each unique test - cardiac testing, cardiac imaging, labs  Care everywhere reviewed for additional records to facilitate comprehensive patient care.    Orders this Visit:  Orders Placed This Encounter   Procedures    Basic metabolic panel    ALT    Follow-Up with Cardiology- [facility]    Follow-Up with Cardiology    Echocardiogram Complete     Orders Placed This Encounter   Medications    adalimumab (HUMIRA) 40 MG/0.8ML prefilled syringe kit     Sig: Inject 40 mg Subcutaneous every 14  days    atenolol (TENORMIN) 25 MG tablet     Sig: Take 1 tablet (25 mg) by mouth daily     Dispense:  90 tablet     Refill:  3    losartan (COZAAR) 25 MG tablet     Sig: Take 1 tablet (25 mg) by mouth 2 times daily     Dispense:  180 tablet     Refill:  4    atorvastatin (LIPITOR) 10 MG tablet     Sig: Take 1 tablet (10 mg) by mouth daily     Dispense:  90 tablet     Refill:  3     Medications Discontinued During This Encounter   Medication Reason    lisinopril (ZESTRIL) 5 MG tablet     atenolol (TENORMIN) 25 MG tablet Reorder (No AVS)     Encounter Diagnoses   Name Primary?    Aortic root aneurysm (H24) Yes    Essential hypertension, benign     Hyperlipidemia LDL goal <70     Ascending aorta dilatation (H24)      CURRENT MEDICATIONS:  Current Outpatient Medications   Medication Sig Dispense Refill    adalimumab (HUMIRA) 40 MG/0.8ML prefilled syringe kit Inject 40 mg Subcutaneous every 14 days      atenolol (TENORMIN) 25 MG tablet Take 1 tablet (25 mg) by mouth daily 90 tablet 3    atorvastatin (LIPITOR) 10 MG tablet Take 1 tablet (10 mg) by mouth daily 90 tablet 3    fluocinonide (LIDEX) 0.05 % external cream APPLY TOPICALLY TO THE AFFECTED AREA TWICE DAILY UNTIL GONE THEN USE AS NEEDED 60 g 1    folic acid (FOLVITE) 1 MG tablet TK 1 T PO QD  10    losartan (COZAAR) 25 MG tablet Take 1 tablet (25 mg) by mouth 2 times daily 180 tablet 4    mirtazapine (REMERON) 7.5 MG tablet Take 1 tablet (7.5 mg) by mouth At Bedtime 90 tablet 3    Multiple Vitamins-Minerals (MULTI COMPLETE PO) Take by mouth daily      sulfaSALAzine ER (AZULFIDINE EN) 500 MG EC tablet Take 1,500 mg by mouth 2 times daily  3    methotrexate 2.5 MG tablet Take 3 tablets (7.5 mg) by mouth every 7 days (Patient not taking: Reported on 6/14/2024)       ALLERGIES     Allergies   Allergen Reactions    Lidocaine Other (See Comments)     PN: as infant was given PCN and novicaine and went into cardiac arrest  PN: as infant was given PCN and novicaine and went  "into cardiac arrest      No Clinical Screening - See Comments      WILBERTO AND LOCAL ANSETHETICS    Penicillins      PAST MEDICAL, SURGICAL, FAMILY, SOCIAL HISTORY:  History was reviewed and updated as needed, see medical record.    Review of Systems:  A 10-point Review Of Systems is otherwise normal except for that which is noted in the HPI and interval summary.    Physical Exam:    Vitals: BP (!) 143/79   Pulse 66   Ht 1.702 m (5' 7\")   Wt 76.1 kg (167 lb 11.2 oz)   BMI 26.27 kg/m    Constitutional: Appears stated age, well nourished, NAD.  Neck: Supple. Carotid pulses full and equal.   Respiratory: Non-labored. Lungs CTAB.  Cardiovascular: RRR, normal S1 and S2. No M/G/R.  No edema.  GI: Soft, non-distended, non-tender.  Skin: Warm and dry.   Musculoskeletal/Extremities: Symmetrical movement.  Neurologic: No gross focal deficits. Alert, awake.  Psychiatric: Affect appropriate. Mentation normal.    Recent Lab Results:  LIPID RESULTS:  Lab Results   Component Value Date    CHOL 138 07/22/2023    CHOL 128 07/06/2015    HDL 51 07/22/2023    HDL 46 07/06/2015    LDL 79 07/22/2023    LDL 71 07/06/2015    TRIG 42 07/22/2023    TRIG 55 07/06/2015    CHOLHDLRATIO 2.80 04/28/2014     LIVER ENZYME RESULTS:  No results found for: \"AST\", \"ALT\"  CBC RESULTS:  Lab Results   Component Value Date    WBC 10.2 06/14/2015    RBC 5.28 06/14/2015    HGB 15.9 06/14/2015    HCT 46.9 06/14/2015    MCV 89 06/14/2015    MCH 30.1 06/14/2015    MCHC 33.9 06/14/2015    RDW 13.0 06/14/2015     06/14/2015     BMP RESULTS:  Lab Results   Component Value Date     10/12/2015    POTASSIUM 4.0 10/12/2015    CHLORIDE 105 10/12/2015    CO2 22 (L) 10/12/2015    ANIONGAP 14 10/12/2015     (H) 10/12/2015    BUN 20 10/12/2015    CR 1.02 10/12/2015    GFRESTIMATED 81 10/12/2015    GFRESTBLACK >90 10/12/2015    SURESH 9.3 10/12/2015      A1C RESULTS:  No results found for: \"A1C\"  INR RESULTS:  No results found for: \"INR\"      Thank " you for allowing me to participate in the care of your patient.      Sincerely,     NORBERTO White [facility]  cc:   Ethan Henry MD  [address]  [place] [id number]

## 2024-06-14 NOTE — PATIENT INSTRUCTIONS
Thank you for your visit with the [facility] today.    Today's Summary:    Stop Lisinopril.  Start Losartan 25 mg twice daily.  Start Atorvastatin 10 mg daily.  Check labs in 2-weeks.  Follow blood pressure at home.  Keep a log for review.  Continue plan for yearly echocardiogram.    Follow-up:  Cardiology follow up at [facility]: Telephone visit in 6-weeks.     Cardiology Scheduling ~[phone]  Cardiology Clinic RN ~ [phone]    It was a pleasure seeing you today.     NORBERTO White, CNP  Certified Nurse Practitioner  [facility]  June 14, 2024  ________________________________________________________

## 2024-06-28 ENCOUNTER — LAB (OUTPATIENT)
Dept: LAB | Facility: CLINIC | Age: 64
End: 2024-06-28
Payer: COMMERCIAL

## 2024-06-28 DIAGNOSIS — Z13.1 SCREENING FOR DIABETES MELLITUS: Primary | ICD-10-CM

## 2024-06-28 DIAGNOSIS — I10 ESSENTIAL HYPERTENSION, BENIGN: ICD-10-CM

## 2024-06-28 DIAGNOSIS — E78.5 HYPERLIPIDEMIA LDL GOAL <70: ICD-10-CM

## 2024-06-28 LAB
ALT SERPL W P-5'-P-CCNC: 74 U/L (ref 0–70)
ANION GAP SERPL CALCULATED.3IONS-SCNC: 10 MMOL/L (ref 7–15)
BUN SERPL-MCNC: 21.4 MG/DL (ref 8–23)
CALCIUM SERPL-MCNC: 10 MG/DL (ref 8.8–10.2)
CHLORIDE SERPL-SCNC: 108 MMOL/L (ref 98–107)
CHOLEST SERPL-MCNC: 122 MG/DL
CREAT SERPL-MCNC: 0.98 MG/DL (ref 0.67–1.17)
DEPRECATED HCO3 PLAS-SCNC: 27 MMOL/L (ref 22–29)
EGFRCR SERPLBLD CKD-EPI 2021: 87 ML/MIN/1.73M2
FASTING STATUS PATIENT QL REPORTED: NO
GLUCOSE SERPL-MCNC: 116 MG/DL (ref 70–99)
GLUCOSE SERPL-MCNC: 116 MG/DL (ref 70–99)
HDLC SERPL-MCNC: 48 MG/DL
LDLC SERPL CALC-MCNC: 50 MG/DL
NONHDLC SERPL-MCNC: 74 MG/DL
POTASSIUM SERPL-SCNC: 4.3 MMOL/L (ref 3.4–5.3)
SODIUM SERPL-SCNC: 145 MMOL/L (ref 135–145)
TRIGL SERPL-MCNC: 118 MG/DL

## 2024-06-28 PROCEDURE — 84460 ALANINE AMINO (ALT) (SGPT): CPT

## 2024-06-28 PROCEDURE — 80061 LIPID PANEL: CPT

## 2024-06-28 PROCEDURE — 80048 BASIC METABOLIC PNL TOTAL CA: CPT

## 2024-06-28 PROCEDURE — 36415 COLL VENOUS BLD VENIPUNCTURE: CPT

## 2024-06-29 SDOH — HEALTH STABILITY: PHYSICAL HEALTH: ON AVERAGE, HOW MANY MINUTES DO YOU ENGAGE IN EXERCISE AT THIS LEVEL?: 30 MIN

## 2024-06-29 SDOH — HEALTH STABILITY: PHYSICAL HEALTH: ON AVERAGE, HOW MANY DAYS PER WEEK DO YOU ENGAGE IN MODERATE TO STRENUOUS EXERCISE (LIKE A BRISK WALK)?: 2 DAYS

## 2024-06-29 ASSESSMENT — SOCIAL DETERMINANTS OF HEALTH (SDOH): HOW OFTEN DO YOU GET TOGETHER WITH FRIENDS OR RELATIVES?: ONCE A WEEK

## 2024-07-02 DIAGNOSIS — F51.04 PSYCHOPHYSIOLOGICAL INSOMNIA: ICD-10-CM

## 2024-07-02 RX ORDER — MIRTAZAPINE 7.5 MG/1
7.5 TABLET, FILM COATED ORAL AT BEDTIME
Qty: 90 TABLET | Refills: 3 | Status: CANCELLED | OUTPATIENT
Start: 2024-07-02

## 2024-07-03 ENCOUNTER — OFFICE VISIT (OUTPATIENT)
Dept: INTERNAL MEDICINE | Facility: CLINIC | Age: 64
End: 2024-07-03
Payer: COMMERCIAL

## 2024-07-03 VITALS
BODY MASS INDEX: 26.62 KG/M2 | DIASTOLIC BLOOD PRESSURE: 78 MMHG | WEIGHT: 169.6 LBS | SYSTOLIC BLOOD PRESSURE: 155 MMHG | HEIGHT: 67 IN | TEMPERATURE: 97.1 F | HEART RATE: 53 BPM | OXYGEN SATURATION: 97 %

## 2024-07-03 DIAGNOSIS — K51.00 ULCERATIVE PANCOLITIS WITHOUT COMPLICATION (H): ICD-10-CM

## 2024-07-03 DIAGNOSIS — R73.9 HYPERGLYCEMIA: ICD-10-CM

## 2024-07-03 DIAGNOSIS — Z00.00 ROUTINE GENERAL MEDICAL EXAMINATION AT A HEALTH CARE FACILITY: Primary | ICD-10-CM

## 2024-07-03 DIAGNOSIS — I10 ESSENTIAL HYPERTENSION, BENIGN: ICD-10-CM

## 2024-07-03 DIAGNOSIS — F51.04 PSYCHOPHYSIOLOGICAL INSOMNIA: ICD-10-CM

## 2024-07-03 DIAGNOSIS — Q25.43 AORTIC ROOT ANEURYSM: ICD-10-CM

## 2024-07-03 DIAGNOSIS — M06.4 INFLAMMATORY POLYARTHROPATHY (H): ICD-10-CM

## 2024-07-03 DIAGNOSIS — L30.9 DERMATITIS: ICD-10-CM

## 2024-07-03 PROCEDURE — 90678 RSV VACC PREF BIVALENT IM: CPT | Performed by: INTERNAL MEDICINE

## 2024-07-03 PROCEDURE — 90471 IMMUNIZATION ADMIN: CPT | Performed by: INTERNAL MEDICINE

## 2024-07-03 PROCEDURE — 99396 PREV VISIT EST AGE 40-64: CPT | Mod: 25 | Performed by: INTERNAL MEDICINE

## 2024-07-03 PROCEDURE — 99214 OFFICE O/P EST MOD 30 MIN: CPT | Mod: 25 | Performed by: INTERNAL MEDICINE

## 2024-07-03 RX ORDER — MULTIVITAMIN
1 TABLET ORAL DAILY
COMMUNITY

## 2024-07-03 RX ORDER — MIRTAZAPINE 7.5 MG/1
7.5 TABLET, FILM COATED ORAL AT BEDTIME
Qty: 90 TABLET | Refills: 3 | Status: SHIPPED | OUTPATIENT
Start: 2024-07-03

## 2024-07-03 RX ORDER — CETIRIZINE HYDROCHLORIDE 10 MG/1
10 TABLET ORAL DAILY
COMMUNITY
Start: 2022-07-03

## 2024-07-03 RX ORDER — FLUOCINONIDE 0.5 MG/G
CREAM TOPICAL
Qty: 60 G | Refills: 1 | Status: SHIPPED | OUTPATIENT
Start: 2024-07-03

## 2024-07-03 RX ORDER — LOSARTAN POTASSIUM 25 MG/1
50 TABLET ORAL 2 TIMES DAILY
Status: SHIPPED
Start: 2024-07-03 | End: 2024-07-30

## 2024-07-03 NOTE — PATIENT INSTRUCTIONS
"Patient Education     www.validateBP.org- list of high quality home BP monitors independently validated for quality and accuracy     When checking your blood pressure at home make sure you do the following:  No exercise, caffeine or nicotine within the past 30 minutes  Sit down and relax for 5 to 10 minutes before checking  3.   Check your blood pressure 3 times  by 1 minute intervals.  Average the 3 readings to get your final value.  4.   If you do not like math instead of averaging the 3 values you can cross out the high and low readings and use the middle value.        Preventive Care Advice   This is general advice we often give to help people stay healthy. Your care team may have specific advice just for you. Please talk to your care team about your own preventive care needs.  Lifestyle  Exercise at least 150 minutes each week (30 minutes a day, 5 days a week).  Do muscle strengthening activities 2 days a week. These help control your weight and prevent disease.  No smoking.  Wear sunscreen to prevent skin cancer.  Have your home tested for radon every 2 to 5 years. Radon is a colorless, odorless gas that can harm your lungs. To learn more, go to www.health.state.[place]. and search for \"Radon in Homes.\"  Keep guns unloaded and locked up in a safe place like a safe or gun vault, or, use a gun lock and hide the keys. Always lock away bullets separately. To learn more, visit FIGS.[place].gov and search for \"safe gun storage.\"  Nutrition  Eat 5 or more servings of fruits and vegetables each day.  Try wheat bread, brown rice and whole grain pasta (instead of white bread, rice, and pasta).  Get enough calcium and vitamin D. Check the label on foods and aim for 100% of the RDA (recommended daily allowance).  Regular exams  Have a dental exam and cleaning every 6 months.  See your health care team every year to talk about:  Any changes in your health.  Any medicines your care team has prescribed.  Preventive care, " family planning, and ways to prevent chronic diseases.  Shots (vaccines)   HPV shots (up to age 26), if you've never had them before.  Hepatitis B shots (up to age 59), if you've never had them before.  COVID-19 shot: Get this shot when it's due.  Flu shot: Get a flu shot every year.  Tetanus shot: Get a tetanus shot every 10 years.  Pneumococcal, hepatitis A, and RSV shots: Ask your care team if you need these based on your risk.  Shingles shot (for age 50 and up).  General health tests  Diabetes screening:  Starting at age 35, Get screened for diabetes at least every 3 years.  If you are younger than age 35, ask your care team if you should be screened for diabetes.  Cholesterol test: At age 39, start having a cholesterol test every 5 years, or more often if advised.  Bone density scan (DEXA): At age 50, ask your care team if you should have this scan for osteoporosis (brittle bones).  Hepatitis C: Get tested at least once in your life.  Abdominal aortic aneurysm screening: Talk to your doctor about having this screening if you:  Have ever smoked; and  Are biologically male; and  Are between the ages of 65 and 75.  STIs (sexually transmitted infections)  Before age 24: Ask your care team if you should be screened for STIs.  After age 24: Get screened for STIs if you're at risk. You are at risk for STIs (including HIV) if:  You are sexually active with more than one person.  You don't use condoms every time.  You or a partner was diagnosed with a sexually transmitted infection.  If you are at risk for HIV, ask about PrEP medicine to prevent HIV.  Get tested for HIV at least once in your life, whether you are at risk for HIV or not.  Cancer screening tests  Cervical cancer screening: If you have a cervix, begin getting regular cervical cancer screening tests at age 21. Most people who have regular screenings with normal results can stop after age 65. Talk about this with your provider.  Breast cancer scan  (mammogram): If you've ever had breasts, begin having regular mammograms starting at age 40. This is a scan to check for breast cancer.  Colon cancer screening: It is important to start screening for colon cancer at age 45.  Have a colonoscopy test every 10 years (or more often if you're at risk) Or, ask your provider about stool tests like a FIT test every year or Cologuard test every 3 years.  To learn more about your testing options, visit: www.LRN/216599.pdf.  For help making a decision, visit: [email or web address].  Prostate cancer screening test: If you have a prostate and are age 55 to 69, ask your provider if you would benefit from a yearly prostate cancer screening test.  Lung cancer screening: If you are a current or former smoker age 50 to 80, ask your care team if ongoing lung cancer screenings are right for you.  For informational purposes only. Not to replace the advice of your health care provider. Copyright   2023 [facility]. All rights reserved. Clinically reviewed by the  [employer] [facility]. [place] [id number] - REV 04/24.  Substance Use Disorder: Care Instructions  Overview     You can improve your life and health by stopping your use of alcohol or drugs. When you don't drink or use drugs, you may feel and sleep better. You may get along better with your family, friends, and coworkers. There are medicines and programs that can help with substance use disorder.  How can you care for yourself at home?  Here are some ways to help you stay sober and prevent relapse.  If you have been given medicine to help keep you sober or reduce your cravings, be sure to take it exactly as prescribed.  Talk to your doctor about programs that can help you stop using drugs or drinking alcohol.  Do not keep alcohol or drugs in your home.  Plan ahead. Think about what you'll say if other people ask you to drink or use drugs. Try not to spend time with people who drink or use drugs.  Use  the time and money spent on drinking or drugs to do something that's important to you.  Preventing a relapse  Have a plan to deal with relapse. Learn to recognize changes in your thinking that lead you to drink or use drugs. Get help before you start to drink or use drugs again.  Try to stay away from situations, friends, or places that may lead you to drink or use drugs.  If you feel the need to drink alcohol or use drugs again, seek help right away. Call a trusted friend or family member. Some people get support from organizations such as Narcotics Anonymous or [facility] or from treatment facilities.  If you relapse, get help as soon as you can. Some people make a plan with another person that outlines what they want that person to do for them if they relapse. The plan usually includes how to handle the relapse and who to notify in case of relapse.  Don't give up. Remember that a relapse doesn't mean that you have failed. Use the experience to learn the triggers that lead you to drink or use drugs. Then quit again. Recovery is a lifelong process. Many people have several relapses before they are able to quit for good.  Follow-up care is a key part of your treatment and safety. Be sure to make and go to all appointments, and call your doctor if you are having problems. It's also a good idea to know your test results and keep a list of the medicines you take.  When should you call for help?   Call 911  anytime you think you may need emergency care. For example, call if you or someone else:    Has overdosed or has withdrawal signs. Be sure to tell the emergency workers that you are or someone else is using or trying to quit using drugs. Overdose or withdrawal signs may include:  Losing consciousness.  Seizure.  Seeing or hearing things that aren't there (hallucinations).     Is thinking or talking about suicide or harming others.   Where to get help 24 hours a day, 7 days a week   If you or someone you know  "talks about suicide, self-harm, a mental health crisis, a substance use crisis, or any other kind of emotional distress, get help right away. You can:    Call the Suicide and Crisis Lifeline at 988.     Call 9-429-919-TALK (1-123.233.9749).     Text HOME to 438023 to access the Crisis Text Line.   Consider saving these numbers in your phone.  Go to [employer] for more information or to chat online.  Call your doctor now or seek immediate medical care if:    You are having withdrawal symptoms. These may include nausea or vomiting, sweating, shakiness, and anxiety.   Watch closely for changes in your health, and be sure to contact your doctor if:    You have a relapse.     You need more help or support to stop.   Where can you learn more?  Go to https://www.PredictionIO.net/patiented  Enter H573 in the search box to learn more about \"Substance Use Disorder: Care Instructions.\"  Current as of: November 15, 2023               Content Version: 14.0    6190-5610 [employer].   Care instructions adapted under license by your healthcare professional. If you have questions about a medical condition or this instruction, always ask your healthcare professional. [employer] disclaims any warranty or liability for your use of this information.         "

## 2024-07-03 NOTE — PROGRESS NOTES
"Preventive Care Visit  [facility]  Kumar Abreu MD, Internal Medicine  Jul 3, 2024      Assessment & Plan     Routine general medical examination at a health care facility  Updated screening, immunizations, prevention.  Please see health maintenance list, care gaps     Essential hypertension, benign  Aortic root aneurysm (H24)  Goal -120 systolic at most.  Increase losartan to 100 mg daily. F/u 1 mo   Cont statin   - **Basic metabolic panel FUTURE 14d; Future  - losartan (COZAAR) 25 MG tablet; Take 2 tablets (50 mg) by mouth 2 times daily    Psychophysiological insomnia  - mirtazapine (REMERON) 7.5 MG tablet; Take 1 tablet (7.5 mg) by mouth at bedtime    Hyperglycemia  - Glucose; Future  - Hemoglobin A1c; Future    Ucolitis  Inflamm polyarthritis   -per specialists  -cont humira            BMI  Estimated body mass index is 26.56 kg/m  as calculated from the following:    Height as of this encounter: 1.702 m (5' 7\").    Weight as of this encounter: 76.9 kg (169 lb 9.6 oz).       Counseling  Appropriate preventive services were discussed with this patient, including applicable screening as appropriate for fall prevention, nutrition, physical activity, Tobacco-use cessation, weight loss and cognition.  Checklist reviewing preventive services available has been given to the patient.  Reviewed patient's diet, addressing concerns and/or questions.   He is at risk for lack of exercise and has been provided with information to increase physical activity for the benefit of his well-being.   He is at risk for psychosocial distress and has been provided with information to reduce risk.       Regular exercise  See Patient Instructions    Lopez Hogue is a 63 year old, presenting for the following:  Physical    Health Care Directive  Patient does not have a Health Care Directive or Living Will: Discussed advance care planning with patient; however, patient declined at this " time.    HPI  Doing well- recently saw CV for his TAA- had statin added and lisinopril switched to losartan. BPs worse since , not great at that time as well. Feels fine though.  UC- stable. No sx. Has colonoscopy later this summer  Infl polyarthritis- seeing Rheumatology at [facility].  On humira now , d/c'd methotrexate              6/29/2024   General Health   How would you rate your overall physical health? Good   Feel stress (tense, anxious, or unable to sleep) Only a little      (!) STRESS CONCERN      6/29/2024   Nutrition   Three or more servings of calcium each day? Yes   Diet: Regular (no restrictions)   How many servings of fruit and vegetables per day? (!) 0-1   How many sweetened beverages each day? 0-1            6/29/2024   Exercise   Days per week of moderate/strenous exercise 2 days   Average minutes spent exercising at this level 30 min      (!) EXERCISE CONCERN      6/29/2024   Social Factors   Frequency of gathering with friends or relatives Once a week   Worry food won't last until get money to buy more No   Food not last or not have enough money for food? No   Do you have housing? (Housing is defined as stable permanent housing and does not include staying ouside in a car, in a tent, in an abandoned building, in an overnight shelter, or couch-surfing.) Yes   Are you worried about losing your housing? No   Lack of transportation? No   Unable to get utilities (heat,electricity)? No            6/29/2024   Fall Risk   Fallen 2 or more times in the past year? No   Trouble with walking or balance? No             6/29/2024   Dental   Dentist two times every year? Yes            6/29/2024   TB Screening   Were you born outside of the [place]? No            Today's PHQ-2 Score:       7/2/2024     8:53 AM   PHQ-2 ( 1999 [employer])   Q1: Little interest or pleasure in doing things 0   Q2: Feeling down, depressed or hopeless 0   PHQ-2 Score 0   Q1: Little interest or pleasure in doing things Not at all   Q2:  "Feeling down, depressed or hopeless Not at all   PHQ-2 Score 0           6/29/2024   Substance Use   Alcohol more than 3/day or more than 7/wk No   Do you use any other substances recreationally? (!) CANNABIS PRODUCTS        Social History     Tobacco Use    Smoking status: Former     Current packs/day: 1.00     Average packs/day: 1 pack/day for 34.5 years (34.5 ttl pk-yrs)     Types: Cigarettes     Start date: 1/1/1990    Smokeless tobacco: Never   Substance Use Topics    Alcohol use: Yes     Comment: 2-3 drinks per week    Drug use: No           6/29/2024   STI Screening   New sexual partner(s) since last STI/HIV test? No      Last PSA:   Prostate Specific Antigen Screen   Date Value Ref Range Status   07/22/2023 1.80 0.00 - 4.50 ng/mL Final     ASCVD Risk   The ASCVD Risk score (Dillan SIMMONS, et al., 2019) failed to calculate for the following reasons:    The valid total cholesterol range is 130 to 320 mg/dL           Reviewed and updated as needed this visit by Provider        Surg Hx  Fam Hx            Lab work is in process  Labs reviewed in EPIC         Objective    Exam  BP (!) 155/78   Pulse 53   Temp 97.1  F (36.2  C) (Temporal)   Ht 1.702 m (5' 7\")   Wt 76.9 kg (169 lb 9.6 oz)   SpO2 97%   BMI 26.56 kg/m     Estimated body mass index is 26.56 kg/m  as calculated from the following:    Height as of this encounter: 1.702 m (5' 7\").    Weight as of this encounter: 76.9 kg (169 lb 9.6 oz).    Physical Exam  GENERAL: alert and no distress  EYES: Eyes grossly normal to inspection, PERRL and conjunctivae and sclerae normal  HENT: ear canals and TM's normal, nose and mouth without ulcers or lesions  NECK: no adenopathy, no asymmetry, masses, or scars  RESP: lungs clear to auscultation - no rales, rhonchi or wheezes  CV: regular rate and rhythm, normal S1 S2, no S3 or S4, no murmur, click or rub, no peripheral edema  ABDOMEN: soft, nontender, no hepatosplenomegaly, no masses and bowel sounds " normal  MS: no gross musculoskeletal defects noted, no edema  SKIN: no suspicious lesions or rashes  NEURO: Normal strength and tone, mentation intact and speech normal  PSYCH: mentation appears normal, affect normal/bright  LYMPH: no cervical adenopathy        Signed Electronically by: Kumar Abreu MD

## 2024-07-09 ENCOUNTER — TRANSFERRED RECORDS (OUTPATIENT)
Dept: HEALTH INFORMATION MANAGEMENT | Facility: CLINIC | Age: 64
End: 2024-07-09
Payer: COMMERCIAL

## 2024-07-09 LAB
ALT SERPL-CCNC: 65 IU/L (ref 0–44)
AST SERPL-CCNC: 47 IU/L (ref 0–40)
CREATININE (EXTERNAL): 0.85 MG/DL (ref 0.76–1.27)
GFR ESTIMATED (EXTERNAL): 98 ML/MIN/1.73

## 2024-07-20 ENCOUNTER — MYC REFILL (OUTPATIENT)
Dept: CARDIOLOGY | Facility: CLINIC | Age: 64
End: 2024-07-20
Payer: COMMERCIAL

## 2024-07-20 DIAGNOSIS — Q25.43 AORTIC ROOT ANEURYSM: ICD-10-CM

## 2024-07-20 RX ORDER — ATENOLOL 25 MG/1
25 TABLET ORAL DAILY
Qty: 90 TABLET | Refills: 3 | Status: CANCELLED | OUTPATIENT
Start: 2024-07-20

## 2024-07-23 NOTE — TELEPHONE ENCOUNTER
Follow-up appointment scheduled with provider 7/30 with labs prior     Patient had medication filled 6/14/24    Jerry Estrada RN on 7/23/2024 at 12:20 PM

## 2024-07-24 ENCOUNTER — TRANSFERRED RECORDS (OUTPATIENT)
Dept: HEALTH INFORMATION MANAGEMENT | Facility: CLINIC | Age: 64
End: 2024-07-24
Payer: COMMERCIAL

## 2024-07-25 ENCOUNTER — LAB (OUTPATIENT)
Dept: LAB | Facility: CLINIC | Age: 64
End: 2024-07-25
Payer: COMMERCIAL

## 2024-07-25 DIAGNOSIS — I10 ESSENTIAL HYPERTENSION, BENIGN: ICD-10-CM

## 2024-07-25 DIAGNOSIS — Q25.43 AORTIC ROOT ANEURYSM: ICD-10-CM

## 2024-07-25 LAB
ANION GAP SERPL CALCULATED.3IONS-SCNC: 8 MMOL/L (ref 7–15)
BUN SERPL-MCNC: 28.4 MG/DL (ref 8–23)
CALCIUM SERPL-MCNC: 9.8 MG/DL (ref 8.8–10.4)
CHLORIDE SERPL-SCNC: 107 MMOL/L (ref 98–107)
CREAT SERPL-MCNC: 1.03 MG/DL (ref 0.67–1.17)
EGFRCR SERPLBLD CKD-EPI 2021: 82 ML/MIN/1.73M2
GLUCOSE SERPL-MCNC: 106 MG/DL (ref 70–99)
HCO3 SERPL-SCNC: 26 MMOL/L (ref 22–29)
POTASSIUM SERPL-SCNC: 4.9 MMOL/L (ref 3.4–5.3)
SODIUM SERPL-SCNC: 141 MMOL/L (ref 135–145)

## 2024-07-25 PROCEDURE — 80048 BASIC METABOLIC PNL TOTAL CA: CPT

## 2024-07-25 PROCEDURE — 36415 COLL VENOUS BLD VENIPUNCTURE: CPT

## 2024-07-30 ENCOUNTER — VIRTUAL VISIT (OUTPATIENT)
Dept: CARDIOLOGY | Facility: CLINIC | Age: 64
End: 2024-07-30
Attending: NURSE PRACTITIONER
Payer: COMMERCIAL

## 2024-07-30 DIAGNOSIS — I10 ESSENTIAL HYPERTENSION, BENIGN: Primary | ICD-10-CM

## 2024-07-30 DIAGNOSIS — I77.810 ASCENDING AORTA DILATATION (H): ICD-10-CM

## 2024-07-30 DIAGNOSIS — Q25.43 AORTIC ROOT ANEURYSM: ICD-10-CM

## 2024-07-30 DIAGNOSIS — E78.5 HYPERLIPIDEMIA LDL GOAL <70: ICD-10-CM

## 2024-07-30 PROCEDURE — 99442 PR PHYSICIAN TELEPHONE EVALUATION 11-20 MIN: CPT | Performed by: NURSE PRACTITIONER

## 2024-07-30 RX ORDER — LOSARTAN POTASSIUM 50 MG/1
50 TABLET ORAL 2 TIMES DAILY
Qty: 180 TABLET | Refills: 4 | Status: SHIPPED | OUTPATIENT
Start: 2024-07-30

## 2024-07-30 NOTE — LETTER
7/30/2024    Otto Ross MD  Xxx Retired Sept 2023 Xxx    RE: Mykel Fishman       Dear Colleague,     I had the pleasure of seeing Mykel Fishman in the [facility].        Cardiology Virtual Visit Progress Note    Service Date: July 30, 2024  Primary Cardiologist: Dr. Henry  Reason for visit: Annual follow up    . Mykel Fishman is a 63 year old male who is being evaluated via a billable telephone visit.    Patient has given verbal consent for virtual visit?  Yes     History of Present Illness     Mykel Fishman is a very pleasant 63 year old male with a past medical history notable for aortic root enlargement, hypertension suboptimally controlled.     Patient has followed with us in cardiology clinic dating back to 2014.  He was a prior patient of Dr. Whittaker, and now follows with Dr. Henry.  He has been followed for stable 4.7 cm aortic root enlargement.  At his last follow-up 1 year ago, patient felt well without any dyspnea on exertion or exertional chest discomfort.  At that visit, he noted had a very salty dinner the night prior and his blood pressure was elevated at the clinic visit.  He is tolerating his medication regimen well.     His current hypertensive regimen consist of atenolol 25 mg daily, lisinopril 5 mg twice daily.  Renal function has remained stable on BMP.  His lipid profile is well-controlled, he is not currently on statin therapy.     Diagnostics:  6/13/24 Echocardiogram: Normal LV size with normal LV wall thickness and EF 60 to 65%.  Early diastolic function present.  No regional wall motion abnormalities.  2+ AR.  No aortic stenosis.  Aortic root aneurysm noted at 4.7 cm, ascending aortic dilation present at 4.2 cm.  Compared to study from 2023, aortic root measured 4.7 cm, ascending aorta measured 3.8 cm.    At his yearly follow up, BP remained elevated at 143/79.  Patient notes that his home readings averaged systolic 130s.  He dose have aortic enlargement.  Was on  Lisinopril, and we changed this to Losartan.  He was also started on low-dose statin.    Interval 07/30/24    His blood pressure trend has improved at home.   He monitors this very closely at home.  He is seeing more systolic trends in the 130s, improved from prior.  His PCP increased his losartan since I last saw him.  He is now on 50 mg BID.  He is tolerating his medication regimen well.    __________________________________________________________________         Assessment and Impression:     Hypertension  Improved control based on excellent home trends.  Ongoing lifestyle modification, salt reduction.  Previously on Lisinopril; changed to Losartan 25 mg BID.  In the interim, increased to 50 mg BID.  BMP 7/25/24 stable.  Aortic root enlargement 4.7 cm.  Ascending aorta dilation 4.2 cm.  Similar measurement in 2024 compared to 2020  Needs better BP control for aortic stability.  Started on low-dose Atorvastatin 10 mg/d. Cautious given hepatic history.  ALT 7/9/24 after starting statin stable, 82>74>65         Recommendations and Plan:     Continue current regimen without additional changes today.  Patient will continue to monitor BP at home, and keep a log for review.  At follow up, if we still have room to optimize BP, consider additional agents, or transition from Atenolol to Carvedilol.  [facility] follow up in 6-months.  __________________________________________________________________    Thank you for the opportunity to participate in this pleasant patient's care.   We would be happy to see him sooner if needed for any concerns in the meantime.     NORBERTO White, CNP   Nurse Practitioner  [facility]  Pager: [phone]   Text Page (8am - 5pm, M-F)    Provider location: office  Patient location: Home  Total time on phone call: 10 minutes, 14 seconds.    Today's clinic visit entailed:  Prescription drug management  The level of medical decision making during this visit was of moderate  complexity.  Reviewed past cardiology testing, office notes, procedure records, labs    Orders this Visit:  Orders Placed This Encounter   Procedures    Follow-Up with Cardiology- [facility]     Orders Placed This Encounter   Medications    losartan (COZAAR) 50 MG tablet     Sig: Take 1 tablet (50 mg) by mouth 2 times daily     Dispense:  180 tablet     Refill:  4     Medications Discontinued During This Encounter   Medication Reason    losartan (COZAAR) 25 MG tablet      Encounter Diagnoses   Name Primary?    Ascending aorta dilatation (H24)     Essential hypertension, benign Yes    Hyperlipidemia LDL goal <70     Aortic root aneurysm (H24)        CURRENT MEDICATIONS:  Current Outpatient Medications   Medication Sig Dispense Refill    adalimumab (HUMIRA) 40 MG/0.8ML prefilled syringe kit Inject 40 mg Subcutaneous every 14 days      atenolol (TENORMIN) 25 MG tablet Take 1 tablet (25 mg) by mouth daily 90 tablet 3    atorvastatin (LIPITOR) 10 MG tablet Take 1 tablet (10 mg) by mouth daily 90 tablet 3    folic acid (FOLVITE) 1 MG tablet TK 1 T PO QD  10    losartan (COZAAR) 50 MG tablet Take 1 tablet (50 mg) by mouth 2 times daily 180 tablet 4    mirtazapine (REMERON) 7.5 MG tablet Take 1 tablet (7.5 mg) by mouth at bedtime 90 tablet 3    Multiple Vitamin (ONE-A-DAY ESSENTIAL) TABS Take 1 tablet by mouth daily      sulfaSALAzine ER (AZULFIDINE EN) 500 MG EC tablet Take 1,500 mg by mouth 2 times daily  3    cetirizine (ZYRTEC) 10 MG tablet Take 10 mg by mouth daily (Patient not taking: Reported on 7/3/2024)      fluocinonide (LIDEX) 0.05 % external cream APPLY TOPICALLY TO THE AFFECTED AREA TWICE DAILY UNTIL GONE THEN USE AS NEEDED 60 g 1    Multiple Vitamins-Minerals (MULTI COMPLETE PO) Take by mouth daily (Patient not taking: Reported on 7/30/2024)         ALLERGIES  Allergies   Allergen Reactions    Lidocaine Other (See Comments)     PN: as infant was given PCN and novicaine and went into cardiac arrest  PN: as infant was  given PCN and novicaine and went into cardiac arrest      Procaine Other (See Comments)     Cardiac arrest    No Clinical Screening - See Comments      NOVACAINE AND LOCAL ANSETHETICS    Penicillins        PAST MEDICAL, SURGICAL, FAMILY HISTORY:  History was reviewed and updated as needed, see medical record.    SOCIAL HISTORY:  Social History     Socioeconomic History    Marital status:      Spouse name: Not on file    Number of children: 2    Years of education: Not on file    Highest education level: Not on file   Occupational History    Not on file   Tobacco Use    Smoking status: Former     Current packs/day: 1.00     Average packs/day: 1 pack/day for 34.6 years (34.6 ttl pk-yrs)     Types: Cigarettes     Start date: 1/1/1990    Smokeless tobacco: Never   Substance and Sexual Activity    Alcohol use: Yes     Comment: 2-3 drinks per week    Drug use: No    Sexual activity: Not on file   Other Topics Concern    Parent/sibling w/ CABG, MI or angioplasty before 65F 55M? Yes     Comment: 28 years ago i see Dr nath at [facility] that reason     Service Not Asked    Blood Transfusions Not Asked    Caffeine Concern No     Comment: 2 cups coffee    Occupational Exposure Not Asked    Hobby Hazards Not Asked    Sleep Concern Not Asked    Stress Concern Not Asked    Weight Concern Not Asked    Special Diet Not Asked    Back Care Not Asked    Exercise Yes     Comment: daily, videos    Bike Helmet Not Asked    Seat Belt Yes    Self-Exams Not Asked   Social History Narrative    Not on file     Social Determinants of Health     Financial Resource Strain: Low Risk  (6/29/2024)    Financial Resource Strain     Within the past 12 months, have you or your family members you live with been unable to get utilities (heat, electricity) when it was really needed?: No   Food Insecurity: Low Risk  (6/29/2024)    Food Insecurity     Within the past 12 months, did you worry that your food would run out before you got money  to buy more?: No     Within the past 12 months, did the food you bought just not last and you didn t have money to get more?: No   Transportation Needs: Low Risk  (6/29/2024)    Transportation Needs     Within the past 12 months, has lack of transportation kept you from medical appointments, getting your medicines, non-medical meetings or appointments, work, or from getting things that you need?: No   Physical Activity: Insufficiently Active (6/29/2024)    Exercise Vital Sign     Days of Exercise per Week: 2 days     Minutes of Exercise per Session: 30 min   Stress: No Stress Concern Present (6/29/2024)    [employer] - Occupational Stress Questionnaire     Feeling of Stress : Only a little   Social Connections: Unknown (6/29/2024)    Social Connection and Isolation Panel [NHANES]     Frequency of Communication with Friends and Family: Not on file     Frequency of Social Gatherings with Friends and Family: Once a week     Attends Jain Services: Not on file     Active Member of Clubs or Organizations: Not on file     Attends Club or Organization Meetings: Not on file     Marital Status: Not on file   Interpersonal Safety: Low Risk  (7/3/2024)    Interpersonal Safety     Do you feel physically and emotionally safe where you currently live?: Yes     Within the past 12 months, have you been hit, slapped, kicked or otherwise physically hurt by someone?: No     Within the past 12 months, have you been humiliated or emotionally abused in other ways by your partner or ex-partner?: No   Housing Stability: Low Risk  (6/29/2024)    Housing Stability     Do you have housing? : Yes     Are you worried about losing your housing?: No       Review of Systems:  Skin: negative  Eyes: negative  Ears/Nose/Throat: negative  Respiratory: No shortness of breath, dyspnea on exertion, cough, or hemoptysis  Cardiovascular: negative  Gastrointestinal: negative  Genitourinary: negative  Musculoskeletal:  negative  Neurologic: negative  Psychiatric: negative  Hematologic/Lymphatic/Immunologic: negative  Endocrine: negative    PSYCH: Alert and oriented times 3; coherent speech, normal   rate and volume, able to articulate logical thoughts, able   to abstract reason, no tangential thoughts, no hallucinations   or delusions. his affect is normal  RESP: No cough, no audible wheezing, able to talk in full sentences    Remainder of the comprehensive physical exam is unable to be completed due to today's visit being completed via telephone call.    There were no vitals taken for this visit.   Wt Readings from Last 4 Encounters:   07/03/24 76.9 kg (169 lb 9.6 oz)   06/14/24 76.1 kg (167 lb 11.2 oz)   07/07/23 73.4 kg (161 lb 14.4 oz)   05/12/23 72.6 kg (160 lb)     Recent Lab Results:  LIPID RESULTS:  Lab Results   Component Value Date    CHOL 122 06/28/2024    CHOL 128 07/06/2015    HDL 48 06/28/2024    HDL 46 07/06/2015    LDL 50 06/28/2024    LDL 71 07/06/2015    TRIG 118 06/28/2024    TRIG 55 07/06/2015    CHOLHDLRATIO 2.80 04/28/2014     LIVER ENZYME RESULTS:  Lab Results   Component Value Date    ALT 74 (H) 06/28/2024     CBC RESULTS:  Lab Results   Component Value Date    WBC 10.2 06/14/2015    RBC 5.28 06/14/2015    HGB 15.9 06/14/2015    HCT 46.9 06/14/2015    MCV 89 06/14/2015    MCH 30.1 06/14/2015    MCHC 33.9 06/14/2015    RDW 13.0 06/14/2015     06/14/2015     BMP RESULTS:  Lab Results   Component Value Date     07/25/2024     10/12/2015    POTASSIUM 4.9 07/25/2024    POTASSIUM 4.0 10/12/2015    CHLORIDE 107 07/25/2024    CHLORIDE 105 10/12/2015    CO2 26 07/25/2024    CO2 22 (L) 10/12/2015    ANIONGAP 8 07/25/2024    ANIONGAP 14 10/12/2015     (H) 07/25/2024     (H) 10/12/2015    BUN 28.4 (H) 07/25/2024    BUN 20 10/12/2015    CR 1.03 07/25/2024    CR 1.02 10/12/2015    GFRESTIMATED 82 07/25/2024    GFRESTIMATED 81 10/12/2015    GFRESTBLACK >90 10/12/2015    SURESH 9.8 07/25/2024  "   SURESH 9.3 10/12/2015      A1C RESULTS:  No results found for: \"A1C\"  INR RESULTS:  No results found for: \"INR\"    CC  NORBERTO White CNP  [address]  [place] [address]    This note was completed in part using Dragon voice recognition software. Although reviewed after completion, some word and grammatical errors may occur.     Mykel is a 63 year old who is being evaluated via a billable telephone visit.    What phone number would you like to be contacted at? [phone]  How would you like to obtain your AVS? MyChart  Originating Location (pt. Location): Home  Distant Location (provider location):  Off-site  Phone call duration:        minutes   Review Of Systems  Skin: NEGATIVE  Eyes:Ears/Nose/Throat: NEGATIVE  Respiratory: NEGATIVE  Cardiovascular:NEGATIVE  Gastrointestinal: NEGATIVE  Genitourinary:NEGATIVE   Musculoskeletal: NEGATIVE  Neurologic: NEGATIVE  Psychiatric: NEGATIVE  Hematologic/Lymphatic/Immunologic: NEGATIVE  Endocrine:  NEGATIVE  Vitals - Patient Reported  Systolic (Patient Reported):  (n/a)  Diastolic (Patient Reported):  (n/a)  Weight (Patient Reported): 73.9 kg (163 lb)  Height (Patient Reported): 170.2 cm (5' 7\")  BMI (Based on Pt Reported Ht/Wt): 25.53  Pulse (Patient Reported):  (n/a)  Patient states BP readings have averaged 139/75     Telephone number of patient: [phone]     Emi Murray LPN      Thank you for allowing me to participate in the care of your patient.      Sincerely,     NORBERTO White CNP     [facility]  cc:   NORBERTO White CNP  [address]  [place] [address]      "

## 2024-08-01 ENCOUNTER — VIRTUAL VISIT (OUTPATIENT)
Dept: INTERNAL MEDICINE | Facility: CLINIC | Age: 64
End: 2024-08-01
Payer: COMMERCIAL

## 2024-08-01 ENCOUNTER — MYC MEDICAL ADVICE (OUTPATIENT)
Dept: INTERNAL MEDICINE | Facility: CLINIC | Age: 64
End: 2024-08-01

## 2024-08-01 DIAGNOSIS — Q25.43 AORTIC ROOT ANEURYSM: ICD-10-CM

## 2024-08-01 DIAGNOSIS — I10 BENIGN ESSENTIAL HYPERTENSION: Primary | ICD-10-CM

## 2024-08-01 PROCEDURE — 99442 PR PHYSICIAN TELEPHONE EVALUATION 11-20 MIN: CPT | Performed by: INTERNAL MEDICINE

## 2024-08-01 RX ORDER — HYDROCHLOROTHIAZIDE 12.5 MG/1
12.5 TABLET ORAL DAILY
Qty: 90 TABLET | Refills: 1 | Status: SHIPPED | OUTPATIENT
Start: 2024-08-01

## 2024-08-01 NOTE — PROGRESS NOTES
Mykel is a 63 year old who is being evaluated via a billable telephone visit.    What phone number would you like to be contacted at? [phone]  How would you like to obtain your AVS? Jonahart  Originating Location (pt. Location): Home    Distant Location (provider location):  On-site    Assessment & Plan     Benign essential hypertension  Add hydrochlorothiazide - labs 1 mo , f/u visit 6-8 wks   - hydroCHLOROthiazide 12.5 MG tablet; Take 1 tablet (12.5 mg) by mouth daily  Goal BP< 120 systolic  - **Basic metabolic panel FUTURE 14d; Future        Subjective   Mykel is a 63 year old, presenting for the following health issues:  Hypertension      Via the Health Maintenance questionnaire, the patient has reported the following services have been completed -Colonscopy: [place] 2024-07-25, this information has been sent to the abstraction team.  History of Present Illness       Hypertension: He presents for follow up of hypertension.  He does check blood pressure  regularly outside of the clinic. Outside blood pressures have been over 140/90. He does not follow a low salt diet.     He eats 0-1 servings of fruits and vegetables daily.He consumes 2 sweetened beverage(s) daily.He exercises with enough effort to increase his heart rate 10 to 19 minutes per day.  He exercises with enough effort to increase his heart rate 3 or less days per week. He is missing 1 dose(s) of medications per week.                   Objective    Vitals - Patient Reported  Systolic (Patient Reported): 134  Diastolic (Patient Reported): 79  Pts bp log sent in - avg BP       Physical Exam   General: Alert and no distress //Respiratory: No audible wheeze, cough, or shortness of breath // Psychiatric:  Appropriate affect, tone, and pace of words            Phone call duration: 11 minutes  Signed Electronically by: Kumar Abreu MD

## 2024-08-02 ENCOUNTER — TRANSFERRED RECORDS (OUTPATIENT)
Dept: HEALTH INFORMATION MANAGEMENT | Facility: CLINIC | Age: 64
End: 2024-08-02
Payer: COMMERCIAL

## 2024-10-16 ENCOUNTER — TRANSFERRED RECORDS (OUTPATIENT)
Dept: HEALTH INFORMATION MANAGEMENT | Facility: CLINIC | Age: 64
End: 2024-10-16
Payer: COMMERCIAL

## 2024-10-16 LAB
ALT SERPL-CCNC: 65 IU/L (ref 0–44)
AST SERPL-CCNC: 37 IU/L (ref 0–40)

## 2024-11-01 ENCOUNTER — TRANSFERRED RECORDS (OUTPATIENT)
Dept: HEALTH INFORMATION MANAGEMENT | Facility: CLINIC | Age: 64
End: 2024-11-01
Payer: COMMERCIAL

## 2024-12-05 ENCOUNTER — TRANSFERRED RECORDS (OUTPATIENT)
Dept: HEALTH INFORMATION MANAGEMENT | Facility: CLINIC | Age: 64
End: 2024-12-05
Payer: COMMERCIAL

## 2024-12-13 ENCOUNTER — TRANSFERRED RECORDS (OUTPATIENT)
Dept: HEALTH INFORMATION MANAGEMENT | Facility: CLINIC | Age: 64
End: 2024-12-13
Payer: COMMERCIAL

## 2024-12-24 ENCOUNTER — HOSPITAL ENCOUNTER (OUTPATIENT)
Dept: MRI IMAGING | Facility: CLINIC | Age: 64
Discharge: HOME | End: 2024-12-24
Attending: INTERNAL MEDICINE
Payer: COMMERCIAL

## 2024-12-24 DIAGNOSIS — Z71.3 DIETARY COUNSELING AND SURVEILLANCE: ICD-10-CM

## 2024-12-24 DIAGNOSIS — R74.01 ELEVATED ALT MEASUREMENT: ICD-10-CM

## 2024-12-24 DIAGNOSIS — R79.89 ABNORMAL LIVER FUNCTION TESTS: ICD-10-CM

## 2024-12-24 DIAGNOSIS — I10 ESSENTIAL (PRIMARY) HYPERTENSION: ICD-10-CM

## 2024-12-24 PROCEDURE — 255N000002 HC RX 255 OP 636: Performed by: INTERNAL MEDICINE

## 2024-12-24 PROCEDURE — 74183 MRI ABD W/O CNTR FLWD CNTR: CPT

## 2024-12-24 PROCEDURE — A9585 GADOBUTROL INJECTION: HCPCS | Performed by: INTERNAL MEDICINE

## 2024-12-24 RX ORDER — GADOBUTROL 604.72 MG/ML
0.1 INJECTION INTRAVENOUS ONCE
Status: COMPLETED | OUTPATIENT
Start: 2024-12-24 | End: 2024-12-24

## 2024-12-24 RX ADMIN — GADOBUTROL 8 ML: 604.72 INJECTION INTRAVENOUS at 09:12

## 2024-12-31 DIAGNOSIS — F51.04 PSYCHOPHYSIOLOGICAL INSOMNIA: ICD-10-CM

## 2024-12-31 RX ORDER — MIRTAZAPINE 7.5 MG/1
7.5 TABLET, FILM COATED ORAL AT BEDTIME
Qty: 90 TABLET | Refills: 0 | Status: SHIPPED | OUTPATIENT
Start: 2024-12-31

## 2024-12-31 NOTE — TELEPHONE ENCOUNTER
Protocol fail -pt has refills but asking for it to be sent to different pharmacy     Please advise

## 2024-12-31 NOTE — TELEPHONE ENCOUNTER
Patient requesting PHARMACY CHANGE for this prescription. Please re-route to:  [facility]  Fax: [phone]  Phone: [phone]

## 2025-01-24 DIAGNOSIS — F51.04 PSYCHOPHYSIOLOGICAL INSOMNIA: ICD-10-CM

## 2025-01-27 RX ORDER — MIRTAZAPINE 7.5 MG/1
7.5 TABLET, FILM COATED ORAL AT BEDTIME
Qty: 90 TABLET | Refills: 0 | OUTPATIENT
Start: 2025-01-27

## 2025-02-04 ENCOUNTER — MYC REFILL (OUTPATIENT)
Dept: INTERNAL MEDICINE | Facility: CLINIC | Age: 65
End: 2025-02-04
Payer: COMMERCIAL

## 2025-02-04 DIAGNOSIS — F51.04 PSYCHOPHYSIOLOGICAL INSOMNIA: ICD-10-CM

## 2025-02-04 RX ORDER — MIRTAZAPINE 7.5 MG/1
7.5 TABLET, FILM COATED ORAL AT BEDTIME
Qty: 90 TABLET | Refills: 2 | Status: SHIPPED | OUTPATIENT
Start: 2025-02-04

## 2025-03-17 ENCOUNTER — TRANSFERRED RECORDS (OUTPATIENT)
Dept: HEALTH INFORMATION MANAGEMENT | Facility: CLINIC | Age: 65
End: 2025-03-17
Payer: COMMERCIAL

## 2025-04-03 DIAGNOSIS — Q25.43 AORTIC ROOT ANEURYSM: ICD-10-CM

## 2025-04-03 DIAGNOSIS — E78.5 HYPERLIPIDEMIA LDL GOAL <70: ICD-10-CM

## 2025-04-03 RX ORDER — ATORVASTATIN CALCIUM 10 MG/1
10 TABLET, FILM COATED ORAL
Qty: 90 TABLET | Refills: 0 | Status: SHIPPED | OUTPATIENT
Start: 2025-04-03

## 2025-05-02 ENCOUNTER — TRANSFERRED RECORDS (OUTPATIENT)
Dept: HEALTH INFORMATION MANAGEMENT | Facility: CLINIC | Age: 65
End: 2025-05-02
Payer: COMMERCIAL

## 2025-05-15 ENCOUNTER — RESULTS FOLLOW-UP (OUTPATIENT)
Dept: CARDIOLOGY | Facility: CLINIC | Age: 65
End: 2025-05-15

## 2025-05-15 ENCOUNTER — HOSPITAL ENCOUNTER (OUTPATIENT)
Dept: CARDIOLOGY | Facility: CLINIC | Age: 65
End: 2025-05-15
Attending: NURSE PRACTITIONER
Payer: COMMERCIAL

## 2025-05-15 DIAGNOSIS — Q25.43 AORTIC ROOT ANEURYSM: ICD-10-CM

## 2025-05-15 LAB — LVEF ECHO: NORMAL

## 2025-05-15 PROCEDURE — 93306 TTE W/DOPPLER COMPLETE: CPT

## 2025-05-19 NOTE — PROGRESS NOTES
~Cardiology Clinic Visit~    Primary Cardiologist: Dr. Henry    History of Present Illness    Mykel Fishman is a very pleasant 63 year old male with a past medical history notable for aortic root enlargement, hypertension suboptimally controlled.     Patient has followed with us in cardiology clinic dating back to 2014.  He was a prior patient of Dr. Whittaker, and now follows with Dr. Henry.  He has been followed for stable 4.7 cm aortic root enlargement.  At his last follow-up 1 year ago, patient felt well without any dyspnea on exertion or exertional chest discomfort.  At that visit, he noted had a very salty dinner the night prior and his blood pressure was elevated at the clinic visit.  He is tolerating his medication regimen well.     His current hypertensive regimen consist of atenolol 25 mg daily, lisinopril 5 mg twice daily.  Renal function has remained stable on BMP.  His lipid profile is well-controlled, he is not currently on statin therapy.     Diagnostics:    5/15/2025 echocardiogram: EF 55-60% with aortic root measuring 46 mm, ascending aorta measuring 40 mm.  1+ AR.  Prior echo from 6/2024 revealed mild AI, aortic root 47 mm and ascending aorta 42 mm.    At routine follow-ups, his blood pressure has remained elevated.  He has had some home readings with systolic in the 130s.  In the past, we switched his lisinopril to losartan, he is also on a statin.  I saw him in summer 2024, and he was doing well overall.  His blood pressure remained elevated and we increased his losartan even further.     Interval 05/20/25    Today, patient is doing well, and states that he really has no bothersome concerns from a heart standpoint.  He has arthritis and was previously on Humira but was switched to a different medication and had worsening symptoms.  He is working closely with his rheumatologist.  We reviewed his echocardiogram results.  His blood pressure readings have continued normal range in 7/2023.  He  has been considering a Mediterranean diet as recommended by his rheumatologist to help his symptoms as well.  __________________________________________________________________         Assessment and Impression:     Essential hypertension  Improved control based on excellent home trends.  Ongoing lifestyle modification, salt reduction.  On losartan 50 mg twice daily  Aortic root enlargement 4.6 cm  Ascending aorta dilation 4.0 cm  Nonrheumatic aortic valve insufficiency, stable  Similar measurement in 2025 compared to 2020  Needs better BP control for aortic stability.  Started on low-dose Atorvastatin 10 mg/d. Cautious given hepatic history.  ALT stable  AI has improved, likely from slight remodeling and improvement of the aortic root dimensions  Rheumatoid arthritis, follows with rheumatology         Recommendations and Plan:     Stop losartan, start valsartan 80 mg twice daily.  Weight reduction recommended to target BMI less than 25  Consider DASH diet for blood pressure control.  Recommend CT aorta  In May 2026 for aortic surveillance start  Blood pressure monitoring at home, and keep a record for review.  [facility] virtual follow-up in approximately 3 months for BP and medication review.  __________________________________________________________________    Thank you for the opportunity to participate in this pleasant patient's care.    We would be happy to see this patient sooner for any concerns in the meantime.    NORBERTO White, CNP   Nurse Practitioner  [facility]    Today's clinic visit entailed:  The following tests were independently interpreted by me as noted in my documentation: echo, labs  Ordering of each unique test  Prescription drug management  The level of medical decision making during this visit was of moderate complexity.  Review of the result(s) of each unique test - cardiac testing, cardiac imaging, labs  Care everywhere reviewed for additional records to facilitate  comprehensive patient care.  Recent hospitalization records and notes reviewed to facilitate comprehensive care coordination.    Orders this Visit:  Orders Placed This Encounter   Procedures    Follow-Up with Cardiology- [facility]     Orders Placed This Encounter   Medications    predniSONE (DELTASONE) 5 MG tablet     Sig: as needed.    valsartan (DIOVAN) 80 MG tablet     Sig: Take 1 tablet (80 mg) by mouth 2 times daily.     Dispense:  60 tablet     Refill:  4     Medications Discontinued During This Encounter   Medication Reason    cetirizine (ZYRTEC) 10 MG tablet Stopped by Patient (No AVS)    folic acid (FOLVITE) 1 MG tablet Therapy completed (No AVS)    Multiple Vitamins-Minerals (MULTI COMPLETE PO) Duplicate Therapy (No AVS / No eCancel)    sulfaSALAzine ER (AZULFIDINE EN) 500 MG EC tablet Therapy completed (No AVS)    losartan (COZAAR) 50 MG tablet      Encounter Diagnoses   Name Primary?    Aortic root aneurysm Yes    Ascending aorta dilatation     Essential hypertension, benign     Hyperlipidemia LDL goal <70     Inflammatory polyarthropathy (H)      CURRENT MEDICATIONS:  Current Outpatient Medications   Medication Sig Dispense Refill    adalimumab (HUMIRA) 40 MG/0.8ML prefilled syringe kit Inject 40 mg Subcutaneous every 14 days      atenolol (TENORMIN) 25 MG tablet Take 1 tablet (25 mg) by mouth daily 90 tablet 3    atorvastatin (LIPITOR) 10 MG tablet Take 1 tablet by mouth daily. 90 tablet 0    fluocinonide (LIDEX) 0.05 % external cream APPLY TOPICALLY TO THE AFFECTED AREA TWICE DAILY UNTIL GONE THEN USE AS NEEDED 60 g 1    hydroCHLOROthiazide 12.5 MG tablet Take 1 tablet (12.5 mg) by mouth daily. 90 tablet 0    mirtazapine (REMERON) 7.5 MG tablet Take 1 tablet (7.5 mg) by mouth at bedtime. 90 tablet 2    Multiple Vitamin (ONE-A-DAY ESSENTIAL) TABS Take 1 tablet by mouth daily      predniSONE (DELTASONE) 5 MG tablet as needed.      valsartan (DIOVAN) 80 MG tablet Take 1 tablet (80 mg) by mouth 2 times daily.  "60 tablet 4     ALLERGIES     Allergies   Allergen Reactions    Lidocaine Other (See Comments)     PN: as infant was given PCN and novicaine and went into cardiac arrest  PN: as infant was given PCN and novicaine and went into cardiac arrest      Procaine Other (See Comments)     Cardiac arrest    No Clinical Screening - See Comments      NOVACAINE AND LOCAL ANSETHETICS    Penicillins      PAST MEDICAL, SURGICAL, FAMILY, SOCIAL HISTORY:  History was reviewed and updated as needed, see medical record.    Review of Systems:  A 10-point Review Of Systems is otherwise normal except for that which is noted in the HPI and interval summary.    Physical Exam:    Vitals: BP (!) 161/74   Pulse 80   Ht 1.702 m (5' 7\")   Wt 77 kg (169 lb 11.2 oz)   SpO2 96%   BMI 26.58 kg/m    Constitutional: Appears stated age, well nourished, NAD.  Respiratory: Non-labored. Lungs clear  Cardiovascular: RRR, normal S1 and S2. No murmur.  No edema.  GI: Soft, non-distended, non-tender.  Skin: Warm and dry.   Musculoskeletal/Extremities: Symmetrical movement.  Neurologic: No gross focal deficits. Alert, awake.  Psychiatric: Affect appropriate. Mentation normal.    Recent Lab Results:  LIPID RESULTS:  Lab Results   Component Value Date    CHOL 122 06/28/2024    CHOL 128 07/06/2015    HDL 48 06/28/2024    HDL 46 07/06/2015    LDL 50 06/28/2024    LDL 71 07/06/2015    TRIG 118 06/28/2024    TRIG 55 07/06/2015    CHOLHDLRATIO 2.80 04/28/2014     LIVER ENZYME RESULTS:  Lab Results   Component Value Date    ALT 74 (H) 06/28/2024     CBC RESULTS:  Lab Results   Component Value Date    WBC 10.2 06/14/2015    RBC 5.28 06/14/2015    HGB 15.9 06/14/2015    HCT 46.9 06/14/2015    MCV 89 06/14/2015    MCH 30.1 06/14/2015    MCHC 33.9 06/14/2015    RDW 13.0 06/14/2015     06/14/2015     BMP RESULTS:  Lab Results   Component Value Date     07/25/2024     10/12/2015    POTASSIUM 4.9 07/25/2024    POTASSIUM 4.0 10/12/2015    CHLORIDE 107 " "2024    CHLORIDE 105 10/12/2015    CO2 26 2024    CO2 22 (L) 10/12/2015    ANIONGAP 8 2024    ANIONGAP 14 10/12/2015     (H) 2024     (H) 10/12/2015    BUN 28.4 (H) 2024    BUN 20 10/12/2015    CR 1.03 2024    CR 1.02 10/12/2015    GFRESTIMATED 82 2024    GFRESTIMATED 81 10/12/2015    GFRESTBLACK >90 10/12/2015    SURESH 9.8 2024    SURESH 9.3 10/12/2015      A1C RESULTS:  No results found for: \"A1C\"  INR RESULTS:  No results found for: \"INR\"    Recent Results (from the past 4320 hours)   Echocardiogram Complete   Result Value    LVEF  55-60%    Narrative    [id number]  [id number]  [id number]  [id number]^PRUDENCIO^KRYSTAL     [facility]  Echocardiography Laboratory  [address] [place] [address] [address]  [place] [address]  Phone [phone]  Fax [phone]     Name: JUANCHO RIOS  MRN: [id number]  : 1960  Study Date: 05/15/2025 09:29 AM  Age: 64 yrs  Gender: Male  Patient Location: [facility]  Reason For Study: Aortic root aneurysm  Ordering Physician: KRYSTAL FLANNERY  Referring Physician: KRYSTAL FLANNERY  Performed By: Pam Cuello     BSA: 1.8 m2  Height: 67 in  Weight: 160 lb  HR: 59  BP: 155/64 mmHg  ______________________________________________________________________________  Procedure  Echocardiogram with two-dimensional, color and spectral Doppler.     ______________________________________________________________________________  Interpretation Summary     Left ventricular systolic function is normal.  The visual ejection fraction is 55-60%.  No regional wall motion abnormalities noted.  There is mild (1+) aortic regurgitation.  Aortic root aneurysm is present.(46mm)  Ascending aorta dilatation is present.(40mm)  Prior echo from  revealed mild AI, aortic root size of 47mm and ascending  aorta size of 42mm. The study was technically " adequate.  ______________________________________________________________________________  Left Ventricle  The left ventricle is normal in size. There is normal left ventricular wall  thickness. Left ventricular systolic function is normal. The visual ejection  fraction is 55-60%. Diastolic Doppler findings (E/E' ratio and/or other  parameters) suggest left ventricular filling pressures are normal. No regional  wall motion abnormalities noted.     Right Ventricle  The right ventricle is normal size. The right ventricular systolic function is  normal.     Atria  Normal left atrial size. Right atrial size is normal.     Mitral Valve  There is trace mitral regurgitation.     Tricuspid Valve  No tricuspid regurgitation. Right ventricular systolic pressure could not be  approximated due to inadequate tricuspid regurgitation.     Aortic Valve  The aortic valve is trileaflet. There is mild (1+) aortic regurgitation. No  aortic stenosis is present.     Pulmonic Valve  The pulmonic valve is not well visualized.     Vessels  Aortic root aneurysm is present. Ascending aorta dilatation is present.     Pericardium  There is no pericardial effusion.     Rhythm  Sinus rhythm was noted.     ______________________________________________________________________________  MMode/2D Measurements & Calculations  IVSd: 1.0 cm  LVIDd: 5.1 cm  LVIDs: 3.1 cm  LVPWd: 0.95 cm  FS: 38.4 %  LV mass(C)d: 180.8 grams  LV mass(C)dI: 98.3 grams/m2  Ao root diam: 4.6 cm     asc Aorta Diam: 4.0 cm  LVOT diam: 2.4 cm  LVOT area: 4.6 cm2  Ao root diam index Ht(cm/m): 2.7  Ao root diam index BSA (cm/m2): 2.5  Asc Ao diam index BSA (cm/m2): 2.2  Asc Ao diam index Ht(cm/m): 2.3  LA Volume (BP): 29.5 ml  RWT: 0.37  TAPSE: 2.3 cm     Doppler Measurements & Calculations  MV E max jose: 54.0 cm/sec  MV A max jose: 48.0 cm/sec  MV E/A: 1.1  MV dec slope: 161.4 cm/sec2  MV dec time: 0.33 sec  Ao V2 max: 138.0 cm/sec  Ao max P.0 mmHg  Ao V2 mean: 93.1  cm/sec  Ao mean P.0 mmHg  Ao V2 VTI: 33.5 cm  KVNG(I,D): 3.3 cm2  KVNG(V,D): 3.4 cm2  AI P1/2t: 501.3 msec  LV V1 max P.2 mmHg  LV V1 max: 102.0 cm/sec  LV V1 VTI: 23.6 cm     SV(LVOT): 109.0 ml  SI(LVOT): 59.3 ml/m2  PA acc time: 0.12 sec  AV Joaquín Ratio (DI): 0.74  KVNG Index (cm2/m2): 1.8  E/E' av.1  Lateral E/e': 5.6  Medial E/e': 6.5  RV S Joaquín: 11.3 cm/sec     ______________________________________________________________________________  Report approved by: Rick Kaur MD on 05/15/2025 10:37 AM

## 2025-05-20 ENCOUNTER — OFFICE VISIT (OUTPATIENT)
Dept: CARDIOLOGY | Facility: CLINIC | Age: 65
End: 2025-05-20
Payer: COMMERCIAL

## 2025-05-20 ENCOUNTER — MYC MEDICAL ADVICE (OUTPATIENT)
Dept: CARDIOLOGY | Facility: CLINIC | Age: 65
End: 2025-05-20

## 2025-05-20 VITALS
SYSTOLIC BLOOD PRESSURE: 161 MMHG | HEIGHT: 67 IN | HEART RATE: 80 BPM | WEIGHT: 169.7 LBS | OXYGEN SATURATION: 96 % | DIASTOLIC BLOOD PRESSURE: 74 MMHG | BODY MASS INDEX: 26.63 KG/M2

## 2025-05-20 DIAGNOSIS — I10 BENIGN ESSENTIAL HYPERTENSION: ICD-10-CM

## 2025-05-20 DIAGNOSIS — M06.4 INFLAMMATORY POLYARTHROPATHY (H): ICD-10-CM

## 2025-05-20 DIAGNOSIS — Q25.43 AORTIC ROOT ANEURYSM: Primary | ICD-10-CM

## 2025-05-20 DIAGNOSIS — E78.5 HYPERLIPIDEMIA LDL GOAL <70: ICD-10-CM

## 2025-05-20 DIAGNOSIS — I10 ESSENTIAL HYPERTENSION, BENIGN: ICD-10-CM

## 2025-05-20 DIAGNOSIS — I77.810 ASCENDING AORTA DILATATION: ICD-10-CM

## 2025-05-20 PROCEDURE — 3078F DIAST BP <80 MM HG: CPT | Performed by: NURSE PRACTITIONER

## 2025-05-20 PROCEDURE — 3077F SYST BP >= 140 MM HG: CPT | Performed by: NURSE PRACTITIONER

## 2025-05-20 PROCEDURE — 99214 OFFICE O/P EST MOD 30 MIN: CPT | Performed by: NURSE PRACTITIONER

## 2025-05-20 RX ORDER — PREDNISONE 5 MG/1
TABLET ORAL PRN
COMMUNITY
Start: 2025-04-17

## 2025-05-20 RX ORDER — VALSARTAN 80 MG/1
80 TABLET ORAL 2 TIMES DAILY
Qty: 60 TABLET | Refills: 4 | Status: SHIPPED | OUTPATIENT
Start: 2025-05-20

## 2025-05-20 NOTE — PATIENT INSTRUCTIONS
Thank you for your visit with the [facility] today.    Today's Summary:    Stop Losartan (once you get the new blood pressure medication).  Valsartan 80 mg (1 tablet) twice daily.  Keep a log of blood pressure at home.  Will do a CT of the aorta next year.  Check out the DASH diet (low-salt) to reduce blood pressure.  Work on healthy weight reduction to also lower your blood pressure.      Follow-up:  Cardiology follow up at [facility]: 3-4 months for blood pressure review.  Can do VIRTUAL.     Cardiology Scheduling ~[phone]  Cardiology Clinic RN ~ [phone]    It was a pleasure seeing you today.     NORBERTO White, CNP  Certified Nurse Practitioner  [facility]  May 20, 2025  ________________________________________________________

## 2025-05-20 NOTE — LETTER
5/20/2025    Kumar Abreu MD  [address]  [facility] [address]    RE: Mykel Fishman       Dear Colleague,     I had the pleasure of seeing Mykel Fishman in the [facility].              ~Cardiology Clinic Visit~    Primary Cardiologist: Dr. Henry    History of Present Illness    Mykel Fishman is a very pleasant 63 year old male with a past medical history notable for aortic root enlargement, hypertension suboptimally controlled.     Patient has followed with us in cardiology clinic dating back to 2014.  He was a prior patient of Dr. Whittaker, and now follows with Dr. Henry.  He has been followed for stable 4.7 cm aortic root enlargement.  At his last follow-up 1 year ago, patient felt well without any dyspnea on exertion or exertional chest discomfort.  At that visit, he noted had a very salty dinner the night prior and his blood pressure was elevated at the clinic visit.  He is tolerating his medication regimen well.     His current hypertensive regimen consist of atenolol 25 mg daily, lisinopril 5 mg twice daily.  Renal function has remained stable on BMP.  His lipid profile is well-controlled, he is not currently on statin therapy.     Diagnostics:    5/15/2025 echocardiogram: EF 55-60% with aortic root measuring 46 mm, ascending aorta measuring 40 mm.  1+ AR.  Prior echo from 6/2024 revealed mild AI, aortic root 47 mm and ascending aorta 42 mm.    At routine follow-ups, his blood pressure has remained elevated.  He has had some home readings with systolic in the 130s.  In the past, we switched his lisinopril to losartan, he is also on a statin.  I saw him in summer 2024, and he was doing well overall.  His blood pressure remained elevated and we increased his losartan even further.     Interval 05/20/25    Today, patient is doing well, and states that he really has no bothersome concerns from a heart standpoint.  He has arthritis and was previously on Humira but was  switched to a different medication and had worsening symptoms.  He is working closely with his rheumatologist.  We reviewed his echocardiogram results.  His blood pressure readings have continued normal range in 7/2023.  He has been considering a Mediterranean diet as recommended by his rheumatologist to help his symptoms as well.  __________________________________________________________________         Assessment and Impression:     Essential hypertension  Improved control based on excellent home trends.  Ongoing lifestyle modification, salt reduction.  On losartan 50 mg twice daily  Aortic root enlargement 4.6 cm  Ascending aorta dilation 4.0 cm  Nonrheumatic aortic valve insufficiency, stable  Similar measurement in 2025 compared to 2020  Needs better BP control for aortic stability.  Started on low-dose Atorvastatin 10 mg/d. Cautious given hepatic history.  ALT stable  AI has improved, likely from slight remodeling and improvement of the aortic root dimensions  Rheumatoid arthritis, follows with rheumatology         Recommendations and Plan:     Stop losartan, start valsartan 80 mg twice daily.  Weight reduction recommended to target BMI less than 25  Consider DASH diet for blood pressure control.  Recommend CT aorta  In May 2026 for aortic surveillance start  Blood pressure monitoring at home, and keep a record for review.  [facility] virtual follow-up in approximately 3 months for BP and medication review.  __________________________________________________________________    Thank you for the opportunity to participate in this pleasant patient's care.    We would be happy to see this patient sooner for any concerns in the meantime.    NORBERTO White, CNP   Nurse Practitioner  [facility]    Today's clinic visit entailed:  The following tests were independently interpreted by me as noted in my documentation: echo, labs  Ordering of each unique test  Prescription drug management  The  level of medical decision making during this visit was of moderate complexity.  Review of the result(s) of each unique test - cardiac testing, cardiac imaging, labs  Care everywhere reviewed for additional records to facilitate comprehensive patient care.  Recent hospitalization records and notes reviewed to facilitate comprehensive care coordination.    Orders this Visit:  Orders Placed This Encounter   Procedures     Follow-Up with Cardiology- [facility]     Orders Placed This Encounter   Medications     predniSONE (DELTASONE) 5 MG tablet     Sig: as needed.     valsartan (DIOVAN) 80 MG tablet     Sig: Take 1 tablet (80 mg) by mouth 2 times daily.     Dispense:  60 tablet     Refill:  4     Medications Discontinued During This Encounter   Medication Reason     cetirizine (ZYRTEC) 10 MG tablet Stopped by Patient (No AVS)     folic acid (FOLVITE) 1 MG tablet Therapy completed (No AVS)     Multiple Vitamins-Minerals (MULTI COMPLETE PO) Duplicate Therapy (No AVS / No eCancel)     sulfaSALAzine ER (AZULFIDINE EN) 500 MG EC tablet Therapy completed (No AVS)     losartan (COZAAR) 50 MG tablet      Encounter Diagnoses   Name Primary?     Aortic root aneurysm Yes     Ascending aorta dilatation      Essential hypertension, benign      Hyperlipidemia LDL goal <70      Inflammatory polyarthropathy (H)      CURRENT MEDICATIONS:  Current Outpatient Medications   Medication Sig Dispense Refill     adalimumab (HUMIRA) 40 MG/0.8ML prefilled syringe kit Inject 40 mg Subcutaneous every 14 days       atenolol (TENORMIN) 25 MG tablet Take 1 tablet (25 mg) by mouth daily 90 tablet 3     atorvastatin (LIPITOR) 10 MG tablet Take 1 tablet by mouth daily. 90 tablet 0     fluocinonide (LIDEX) 0.05 % external cream APPLY TOPICALLY TO THE AFFECTED AREA TWICE DAILY UNTIL GONE THEN USE AS NEEDED 60 g 1     hydroCHLOROthiazide 12.5 MG tablet Take 1 tablet (12.5 mg) by mouth daily. 90 tablet 0     mirtazapine (REMERON) 7.5 MG tablet Take 1 tablet (7.5  "mg) by mouth at bedtime. 90 tablet 2     Multiple Vitamin (ONE-A-DAY ESSENTIAL) TABS Take 1 tablet by mouth daily       predniSONE (DELTASONE) 5 MG tablet as needed.       valsartan (DIOVAN) 80 MG tablet Take 1 tablet (80 mg) by mouth 2 times daily. 60 tablet 4     ALLERGIES     Allergies   Allergen Reactions     Lidocaine Other (See Comments)     PN: as infant was given PCN and novicaine and went into cardiac arrest  PN: as infant was given PCN and novicaine and went into cardiac arrest       Procaine Other (See Comments)     Cardiac arrest     No Clinical Screening - See Comments      NOVACAINE AND LOCAL ANSETHETICS     Penicillins      PAST MEDICAL, SURGICAL, FAMILY, SOCIAL HISTORY:  History was reviewed and updated as needed, see medical record.    Review of Systems:  A 10-point Review Of Systems is otherwise normal except for that which is noted in the HPI and interval summary.    Physical Exam:    Vitals: BP (!) 161/74   Pulse 80   Ht 1.702 m (5' 7\")   Wt 77 kg (169 lb 11.2 oz)   SpO2 96%   BMI 26.58 kg/m    Constitutional: Appears stated age, well nourished, NAD.  Respiratory: Non-labored. Lungs clear  Cardiovascular: RRR, normal S1 and S2. No murmur.  No edema.  GI: Soft, non-distended, non-tender.  Skin: Warm and dry.   Musculoskeletal/Extremities: Symmetrical movement.  Neurologic: No gross focal deficits. Alert, awake.  Psychiatric: Affect appropriate. Mentation normal.    Recent Lab Results:  LIPID RESULTS:  Lab Results   Component Value Date    CHOL 122 06/28/2024    CHOL 128 07/06/2015    HDL 48 06/28/2024    HDL 46 07/06/2015    LDL 50 06/28/2024    LDL 71 07/06/2015    TRIG 118 06/28/2024    TRIG 55 07/06/2015    CHOLHDLRATIO 2.80 04/28/2014     LIVER ENZYME RESULTS:  Lab Results   Component Value Date    ALT 74 (H) 06/28/2024     CBC RESULTS:  Lab Results   Component Value Date    WBC 10.2 06/14/2015    RBC 5.28 06/14/2015    HGB 15.9 06/14/2015    HCT 46.9 06/14/2015    MCV 89 06/14/2015    " "MCH 30.1 2015    MCHC 33.9 2015    RDW 13.0 2015     2015     BMP RESULTS:  Lab Results   Component Value Date     2024     10/12/2015    POTASSIUM 4.9 2024    POTASSIUM 4.0 10/12/2015    CHLORIDE 107 2024    CHLORIDE 105 10/12/2015    CO2 26 2024    CO2 22 (L) 10/12/2015    ANIONGAP 8 2024    ANIONGAP 14 10/12/2015     (H) 2024     (H) 10/12/2015    BUN 28.4 (H) 2024    BUN 20 10/12/2015    CR 1.03 2024    CR 1.02 10/12/2015    GFRESTIMATED 82 2024    GFRESTIMATED 81 10/12/2015    GFRESTBLACK >90 10/12/2015    SURESH 9.8 2024    SURESH 9.3 10/12/2015      A1C RESULTS:  No results found for: \"A1C\"  INR RESULTS:  No results found for: \"INR\"    Recent Results (from the past 4320 hours)   Echocardiogram Complete   Result Value    LVEF  55-60%    Narrative    [id number]  [id number]  [id number]  [id number]^PRUDENCIO^KRYSTAL     [facility]  [facility]  [address]  [place] [address]  Phone [phone]  Fax [phone]     Name: JUANCHO RIOS  MRN: [id number]  : 1960  Study Date: 05/15/2025 09:29 AM  Age: 64 yrs  Gender: Male  Patient Location: [facility]  Reason For Study: Aortic root aneurysm  Ordering Physician: KRYSTAL FLANNERY  Referring Physician: KRYSTAL FLANNERY  Performed By: Pam Cuello     BSA: 1.8 m2  Height: 67 in  Weight: 160 lb  HR: 59  BP: 155/64 mmHg  ______________________________________________________________________________  Procedure  Echocardiogram with two-dimensional, color and spectral Doppler.     ______________________________________________________________________________  Interpretation Summary     Left ventricular systolic function is normal.  The visual ejection fraction is 55-60%.  No regional wall motion abnormalities noted.  There is mild (1+) aortic regurgitation.  Aortic root " aneurysm is present.(46mm)  Ascending aorta dilatation is present.(40mm)  Prior echo from 6/24 revealed mild AI, aortic root size of 47mm and ascending  aorta size of 42mm. The study was technically adequate.  ______________________________________________________________________________  Left Ventricle  The left ventricle is normal in size. There is normal left ventricular wall  thickness. Left ventricular systolic function is normal. The visual ejection  fraction is 55-60%. Diastolic Doppler findings (E/E' ratio and/or other  parameters) suggest left ventricular filling pressures are normal. No regional  wall motion abnormalities noted.     Right Ventricle  The right ventricle is normal size. The right ventricular systolic function is  normal.     Atria  Normal left atrial size. Right atrial size is normal.     Mitral Valve  There is trace mitral regurgitation.     Tricuspid Valve  No tricuspid regurgitation. Right ventricular systolic pressure could not be  approximated due to inadequate tricuspid regurgitation.     Aortic Valve  The aortic valve is trileaflet. There is mild (1+) aortic regurgitation. No  aortic stenosis is present.     Pulmonic Valve  The pulmonic valve is not well visualized.     Vessels  Aortic root aneurysm is present. Ascending aorta dilatation is present.     Pericardium  There is no pericardial effusion.     Rhythm  Sinus rhythm was noted.     ______________________________________________________________________________  MMode/2D Measurements & Calculations  IVSd: 1.0 cm  LVIDd: 5.1 cm  LVIDs: 3.1 cm  LVPWd: 0.95 cm  FS: 38.4 %  LV mass(C)d: 180.8 grams  LV mass(C)dI: 98.3 grams/m2  Ao root diam: 4.6 cm     asc Aorta Diam: 4.0 cm  LVOT diam: 2.4 cm  LVOT area: 4.6 cm2  Ao root diam index Ht(cm/m): 2.7  Ao root diam index BSA (cm/m2): 2.5  Asc Ao diam index BSA (cm/m2): 2.2  Asc Ao diam index Ht(cm/m): 2.3  LA Volume (BP): 29.5 ml  RWT: 0.37  TAPSE: 2.3 cm     Doppler Measurements &  Calculations  MV E max joaquín: 54.0 cm/sec  MV A max joaquín: 48.0 cm/sec  MV E/A: 1.1  MV dec slope: 161.4 cm/sec2  MV dec time: 0.33 sec  Ao V2 max: 138.0 cm/sec  Ao max P.0 mmHg  Ao V2 mean: 93.1 cm/sec  Ao mean P.0 mmHg  Ao V2 VTI: 33.5 cm  KVNG(I,D): 3.3 cm2  KVNG(V,D): 3.4 cm2  AI P1/2t: 501.3 msec  LV V1 max P.2 mmHg  LV V1 max: 102.0 cm/sec  LV V1 VTI: 23.6 cm     SV(LVOT): 109.0 ml  SI(LVOT): 59.3 ml/m2  PA acc time: 0.12 sec  AV Joaquín Ratio (DI): 0.74  KVNG Index (cm2/m2): 1.8  E/E' av.1  Lateral E/e': 5.6  Medial E/e': 6.5  RV S Joaquín: 11.3 cm/sec     ______________________________________________________________________________  Report approved by: Rick Kaur MD on 05/15/2025 10:37 AM                              Thank you for allowing me to participate in the care of your patient.      Sincerely,     NORBERTO White [facility]  cc:   Ethan Henry MD  [address]  [place] [address]

## 2025-05-25 ENCOUNTER — MYC REFILL (OUTPATIENT)
Dept: CARDIOLOGY | Facility: CLINIC | Age: 65
End: 2025-05-25
Payer: COMMERCIAL

## 2025-05-25 DIAGNOSIS — Q25.43 AORTIC ROOT ANEURYSM: ICD-10-CM

## 2025-05-27 RX ORDER — ATENOLOL 25 MG/1
25 TABLET ORAL DAILY
Qty: 90 TABLET | Refills: 3 | Status: SHIPPED | OUTPATIENT
Start: 2025-05-27

## 2025-06-05 RX ORDER — HYDROCHLOROTHIAZIDE 12.5 MG/1
12.5 TABLET ORAL DAILY
Qty: 90 TABLET | Refills: 0 | Status: SHIPPED | OUTPATIENT
Start: 2025-06-05

## 2025-06-15 ENCOUNTER — MYC REFILL (OUTPATIENT)
Dept: CARDIOLOGY | Facility: CLINIC | Age: 65
End: 2025-06-15
Payer: COMMERCIAL

## 2025-06-15 DIAGNOSIS — I10 ESSENTIAL HYPERTENSION, BENIGN: ICD-10-CM

## 2025-06-16 RX ORDER — VALSARTAN 80 MG/1
80 TABLET ORAL 2 TIMES DAILY
Qty: 180 TABLET | Refills: 2 | Status: SHIPPED | OUTPATIENT
Start: 2025-06-16

## 2025-06-25 ENCOUNTER — TRANSFERRED RECORDS (OUTPATIENT)
Dept: GASTROENTEROLOGY | Facility: CLINIC | Age: 65
End: 2025-06-25
Payer: COMMERCIAL

## 2025-06-26 NOTE — PROCEDURES
2025        Mykel Fishman   [address] [place] [address]      Mykel Fishman,  :  1960    Mykel, your recent blood work shows you have an elevated A1c consistent with pre-diabetes.  I recommend you follow-up with your primary care provider for further management of this.  Your ALT which is a liver enzyme continues to be mildly elevated but is stable when compared to previous lab work, with optimization of diabetes management these levels may improve.  Follow-up with Dr. Coe as scheduled in liver clinic in .  Reach out sooner with questions or concerns.  Creatinine 2025 13:59   Description Result Units Flags Range   Creatinine 0.94 mg/dL  0.76-1.27   eGFR 91 mL/min/1.73  >59   Comments   Performed At: [facility] [address], [place], [place], [id number]  Anya Waggoner MD, Phone: [phone]   BUN 2025 13:59   Description Result Units Flags Range   BUN 21 mg/dL  8-27   Comments   Performed At: [facility]  [address] [place] [address], [id number]  Anya Waggoner MD, Phone: [phone]   Hemoglobin A1c 2025 13:59   Description Result Units Flags Range   Hemoglobin A1c 6.2 % H 4.8-5.6   Comments   Performed At: [facility], [id number]  Anya Waggoner MD, Phone: [phone]   Hemoglobin A1c:                                                         .           Prediabetes: 5.7 - 6.4           Diabetes: >6.4           Glycemic control for adults with diabetes: <7.0   Hepatic Function Panel (7) 2025 13:59   Description Result Units Flags Range   Bilirubin, Total 0.5 mg/dL  0.0-1.2   Bilirubin, Direct 0.22 mg/dL  0.00-0.40   AST (SGOT) 33 IU/L  0-40   ALT (SGPT) 49 IU/L H 0-44   Albumin 4.5 g/dL  3.9-4.9   Alkaline Phosphatase 66 IU/L     Protein, Total 6.8 g/dL  6.0-8.5   Comments   Performed At: [facility]  [address] [facility], [place], [place], [id number]  Anya Waggoner MD, Phone: [phone]    CBC With Differential/Platelet 06/16/2025 13:59   Description Result Units Flags Range   Hemoglobin 15.5 g/dL  13.0-17.7   Hematocrit 48.4 %  37.5-51.0   MCV 98 fL H 79-97   MCHC 32.0 g/dL  31.5-35.7   MCH 31.3 pg  26.6-33.0   RDW 12.8 %  11.6-15.4   Platelets 239 x10E3/uL  150-450   Neutrophils 48 %  Not Estab.   Lymphs 39 %  Not Estab.   Monocytes 10 %  Not Estab.   Eos 2 %  Not Estab.   Basos 1 %  Not Estab.   Neutrophils (Absolute) 4.1 x10E3/uL  1.4-7.0   Lymphs (Absolute) 3.4 x10E3/uL H 0.7-3.1   Monocytes(Absolute) 0.9 x10E3/uL  0.1-0.9   Eos (Absolute) 0.2 x10E3/uL  0.0-0.4   Baso (Absolute) 0.1 x10E3/uL  0.0-0.2   Immature Grans (Abs) 0.0 x10E3/uL  0.0-0.1   Immature Granulocytes 0 %  Not Estab.   RBC 4.96 x10E6/uL  4.14-5.80   WBC 8.6 x10E3/uL  3.4-10.8   Comments   3 Months                     Performed At: , [facility] [address], [place], [place], [id number]  Anya Waggoner MD, Phone: [phone]           Thank you.    Electronically signed by:  Abigail Roberto NP 06/25/2025 11:22 AM  Document generated by:  Abigail Roberto NP  06/25/2025  If your provider ordered multiple tests; the results may not become available at the same time.  If multiple test results are received within 14 days of one another, you may receive a duplicate.

## 2025-06-28 DIAGNOSIS — Q25.43 AORTIC ROOT ANEURYSM: ICD-10-CM

## 2025-06-28 DIAGNOSIS — E78.5 HYPERLIPIDEMIA LDL GOAL <70: ICD-10-CM

## 2025-06-30 RX ORDER — ATORVASTATIN CALCIUM 10 MG/1
10 TABLET, FILM COATED ORAL DAILY
Qty: 90 TABLET | Refills: 0 | Status: SHIPPED | OUTPATIENT
Start: 2025-06-30 | End: 2025-07-03

## 2025-06-30 SDOH — HEALTH STABILITY: PHYSICAL HEALTH: ON AVERAGE, HOW MANY MINUTES DO YOU ENGAGE IN EXERCISE AT THIS LEVEL?: 20 MIN

## 2025-06-30 SDOH — HEALTH STABILITY: PHYSICAL HEALTH: ON AVERAGE, HOW MANY DAYS PER WEEK DO YOU ENGAGE IN MODERATE TO STRENUOUS EXERCISE (LIKE A BRISK WALK)?: 2 DAYS

## 2025-06-30 ASSESSMENT — SOCIAL DETERMINANTS OF HEALTH (SDOH): HOW OFTEN DO YOU GET TOGETHER WITH FRIENDS OR RELATIVES?: TWICE A WEEK

## 2025-07-03 ENCOUNTER — OFFICE VISIT (OUTPATIENT)
Dept: INTERNAL MEDICINE | Facility: CLINIC | Age: 65
End: 2025-07-03
Attending: INTERNAL MEDICINE
Payer: COMMERCIAL

## 2025-07-03 VITALS
DIASTOLIC BLOOD PRESSURE: 64 MMHG | HEIGHT: 67 IN | OXYGEN SATURATION: 95 % | WEIGHT: 166.6 LBS | SYSTOLIC BLOOD PRESSURE: 140 MMHG | RESPIRATION RATE: 16 BRPM | TEMPERATURE: 97.4 F | HEART RATE: 50 BPM | BODY MASS INDEX: 26.15 KG/M2

## 2025-07-03 DIAGNOSIS — I10 BENIGN ESSENTIAL HYPERTENSION: ICD-10-CM

## 2025-07-03 DIAGNOSIS — Z00.00 ROUTINE GENERAL MEDICAL EXAMINATION AT A HEALTH CARE FACILITY: Primary | ICD-10-CM

## 2025-07-03 DIAGNOSIS — E78.5 HYPERLIPIDEMIA LDL GOAL <70: ICD-10-CM

## 2025-07-03 DIAGNOSIS — Z12.5 SCREENING FOR PROSTATE CANCER: ICD-10-CM

## 2025-07-03 DIAGNOSIS — Q25.43 AORTIC ROOT ANEURYSM: ICD-10-CM

## 2025-07-03 LAB
ANION GAP SERPL CALCULATED.3IONS-SCNC: 10 MMOL/L (ref 7–15)
BUN SERPL-MCNC: 23.8 MG/DL (ref 8–23)
CALCIUM SERPL-MCNC: 10.1 MG/DL (ref 8.8–10.4)
CHLORIDE SERPL-SCNC: 104 MMOL/L (ref 98–107)
CHOLEST SERPL-MCNC: 120 MG/DL
CREAT SERPL-MCNC: 0.92 MG/DL (ref 0.67–1.17)
EGFRCR SERPLBLD CKD-EPI 2021: >90 ML/MIN/1.73M2
FASTING STATUS PATIENT QL REPORTED: YES
FASTING STATUS PATIENT QL REPORTED: YES
GLUCOSE SERPL-MCNC: 112 MG/DL (ref 70–99)
HCO3 SERPL-SCNC: 25 MMOL/L (ref 22–29)
HDLC SERPL-MCNC: 58 MG/DL
LDLC SERPL CALC-MCNC: 51 MG/DL
NONHDLC SERPL-MCNC: 62 MG/DL
POTASSIUM SERPL-SCNC: 4.1 MMOL/L (ref 3.4–5.3)
PSA SERPL DL<=0.01 NG/ML-MCNC: 1.1 NG/ML (ref 0–4.5)
SODIUM SERPL-SCNC: 139 MMOL/L (ref 135–145)
TRIGL SERPL-MCNC: 55 MG/DL

## 2025-07-03 RX ORDER — ATORVASTATIN CALCIUM 10 MG/1
10 TABLET, FILM COATED ORAL DAILY
Qty: 90 TABLET | Refills: 3 | Status: SHIPPED | OUTPATIENT
Start: 2025-07-03

## 2025-07-03 RX ORDER — HYDROCHLOROTHIAZIDE 25 MG/1
25 TABLET ORAL DAILY
Qty: 90 TABLET | Refills: 3 | Status: SHIPPED | OUTPATIENT
Start: 2025-07-03

## 2025-07-03 NOTE — PATIENT INSTRUCTIONS
Patient Education   Preventive Care Advice   This is general advice given by our system to help you stay healthy. However, your care team may have specific advice just for you. Please talk to your care team about your preventive care needs.  Nutrition  Eat 5 or more servings of fruits and vegetables each day.  Try wheat bread, brown rice and whole grain pasta (instead of white bread, rice, and pasta).  Get enough calcium and vitamin D. Check the label on foods and aim for 100% of the RDA (recommended daily allowance).  Lifestyle  Exercise at least 150 minutes each week  (30 minutes a day, 5 days a week).  Do muscle strengthening activities 2 days a week. These help control your weight and prevent disease.  No smoking.  Wear sunscreen to prevent skin cancer.  Have a dental exam and cleaning every 6 months.  Yearly exams  See your health care team every year to talk about:  Any changes in your health.  Any medicines your care team has prescribed.  Preventive care, family planning, and ways to prevent chronic diseases.  Shots (vaccines)   HPV shots (up to age 26), if you've never had them before.  Hepatitis B shots (up to age 59), if you've never had them before.  COVID-19 shot: Get this shot when it's due.  Flu shot: Get a flu shot every year.  Tetanus shot: Get a tetanus shot every 10 years.  Pneumococcal, hepatitis A, and RSV shots: Ask your care team if you need these based on your risk.  Shingles shot (for age 50 and up)  General health tests  Diabetes screening:  Starting at age 35, Get screened for diabetes at least every 3 years.  If you are younger than age 35, ask your care team if you should be screened for diabetes.  Cholesterol test: At age 39, start having a cholesterol test every 5 years, or more often if advised.  Bone density scan (DEXA): At age 50, ask your care team if you should have this scan for osteoporosis (brittle bones).  Hepatitis C: Get tested at least once in your life.  STIs (sexually  transmitted infections)  Before age 24: Ask your care team if you should be screened for STIs.  After age 24: Get screened for STIs if you're at risk. You are at risk for STIs (including HIV) if:  You are sexually active with more than one person.  You don't use condoms every time.  You or a partner was diagnosed with a sexually transmitted infection.  If you are at risk for HIV, ask about PrEP medicine to prevent HIV.  Get tested for HIV at least once in your life, whether you are at risk for HIV or not.  Cancer screening tests  Cervical cancer screening: If you have a cervix, begin getting regular cervical cancer screening tests starting at age 21.  Breast cancer scan (mammogram): If you've ever had breasts, begin having regular mammograms starting at age 40. This is a scan to check for breast cancer.  Colon cancer screening: It is important to start screening for colon cancer at age 45.  Have a colonoscopy test every 10 years (or more often if you're at risk) Or, ask your provider about stool tests like a FIT test every year or Cologuard test every 3 years.  To learn more about your testing options, visit:   .  For help making a decision, visit:   https://bit.ly/fq53813.  Prostate cancer screening test: If you have a prostate, ask your care team if a prostate cancer screening test (PSA) at age 55 is right for you.  Lung cancer screening: If you are a current or former smoker ages 50 to 80, ask your care team if ongoing lung cancer screenings are right for you.  For informational purposes only. Not to replace the advice of your health care provider. Copyright   2023 [facility]. All rights reserved. Clinically reviewed by the [facility] Transitions Program. [employer] 671703 - REV 01/24.  Substance Use Disorder: Care Instructions  Overview     You can improve your life and health by stopping your use of alcohol or drugs. When you don't drink or use drugs, you may feel and sleep better. You may  get along better with your family, friends, and coworkers. There are medicines and programs that can help with substance use disorder.  How can you care for yourself at home?  Here are some ways to help you stay sober and prevent relapse.  If you have been given medicine to help keep you sober or reduce your cravings, be sure to take it exactly as prescribed.  Talk to your doctor about programs that can help you stop using drugs or drinking alcohol.  Do not keep alcohol or drugs in your home.  Plan ahead. Think about what you'll say if other people ask you to drink or use drugs. Try not to spend time with people who drink or use drugs.  Use the time and money spent on drinking or drugs to do something that's important to you.  Preventing a relapse  Have a plan to deal with relapse. Learn to recognize changes in your thinking that lead you to drink or use drugs. Get help before you start to drink or use drugs again.  Try to stay away from situations, friends, or places that may lead you to drink or use drugs.  If you feel the need to drink alcohol or use drugs again, seek help right away. Call a trusted friend or family member. Some people get support from organizations such as Narcotics Anonymous or [employer] or from treatment facilities.  If you relapse, get help as soon as you can. Some people make a plan with another person that outlines what they want that person to do for them if they relapse. The plan usually includes how to handle the relapse and who to notify in case of relapse.  Don't give up. Remember that a relapse doesn't mean that you have failed. Use the experience to learn the triggers that lead you to drink or use drugs. Then quit again. Recovery is a lifelong process. Many people have several relapses before they are able to quit for good.  Follow-up care is a key part of your treatment and safety. Be sure to make and go to all appointments, and call your doctor if you are having problems. It's  "also a good idea to know your test results and keep a list of the medicines you take.  When should you call for help?   Call 911  anytime you think you may need emergency care. For example, call if you or someone else:    Has overdosed or has withdrawal signs. Be sure to tell the emergency workers that you are or someone else is using or trying to quit using drugs. Overdose or withdrawal signs may include:  Losing consciousness.  Seizure.  Seeing or hearing things that aren't there (hallucinations).     Is thinking or talking about suicide or harming others.   Where to get help 24 hours a day, 7 days a week   If you or someone you know talks about suicide, self-harm, a mental health crisis, a substance use crisis, or any other kind of emotional distress, get help right away. You can:    Call the Suicide and Crisis Lifeline at 988.     Call 6-159-190-TALK (1-279.985.3492).     Text HOME to 742898 to access the Crisis Text Line.   Consider saving these numbers in your phone.  Go to [employer] for more information or to chat online.  Call your doctor now or seek immediate medical care if:    You are having withdrawal symptoms. These may include nausea or vomiting, sweating, shakiness, and anxiety.   Watch closely for changes in your health, and be sure to contact your doctor if:    You have a relapse.     You need more help or support to stop.   Where can you learn more?  Go to https://www.hCentive.net/patiented  Enter H573 in the search box to learn more about \"Substance Use Disorder: Care Instructions.\"  Current as of: August 20, 2024  Content Version: 14.5 2024-2025 [employer].   Care instructions adapted under license by your healthcare professional. If you have questions about a medical condition or this instruction, always ask your healthcare professional. [employer] disclaims any warranty or liability for your use of this information.       "

## 2025-07-03 NOTE — PROGRESS NOTES
"Preventive Care Visit  [facility]  Kumar Abreu MD, Internal Medicine  Jul 3, 2025      Assessment & Plan     Routine general medical examination at a health care facility  Updated screening, immunizations, prevention.  Please see health maintenance list, care gaps     Hyperlipidemia LDL goal <70  Cont statin   - Lipid panel reflex to direct LDL Non-fasting; Future  - atorvastatin (LIPITOR) 10 MG tablet; Take 1 tablet (10 mg) by mouth daily.  - Lipid panel reflex to direct LDL Non-fasting    Benign essential hypertension  Still not as well controlled as it should be considering his AA measurements. Increase hydrochlorothiazide to 25 mg , has f/u in sept where this can be re-checked  - BASIC METABOLIC PANEL; Future  - hydroCHLOROthiazide (HYDRODIURIL) 25 MG tablet; Take 1 tablet (25 mg) by mouth daily.  - BASIC METABOLIC PANEL    Aortic root aneurysm  Better BP control  Statin  Yearly surveillance per CV  - atorvastatin (LIPITOR) 10 MG tablet; Take 1 tablet (10 mg) by mouth daily.    Screening for prostate cancer  - PROSTATE SPEC ANTIGEN SCREEN    Patient has been advised of split billing requirements and indicates understanding: Yes        BMI  Estimated body mass index is 26.09 kg/m  as calculated from the following:    Height as of this encounter: 1.702 m (5' 7\").    Weight as of this encounter: 75.6 kg (166 lb 9.6 oz).   Weight management plan: Discussed healthy diet and exercise guidelines  Reviewed preventive health counseling, as reflected in patient instructions       Regular exercise       Healthy diet/nutrition  Counseling  Appropriate preventive services were addressed with this patient via screening, questionnaire, or discussion as appropriate for fall prevention, nutrition, physical activity, Tobacco-use cessation, social engagement, weight loss and cognition.  Checklist reviewing preventive services available has been given to the patient.  Reviewed patient's diet, addressing " concerns and/or questions.   He is at risk for lack of exercise and has been provided with information to increase physical activity for the benefit of his well-being.     The longitudinal plan of care for the diagnosis(es)/condition(s) as documented were addressed during this visit. Due to the added complexity in care, I will continue to support Mykel in the subsequent management and with ongoing continuity of care.        Subjective   Mykel is a 64 year old, presenting for the following:  Physical           HPI            Advance Care Planning    Discussed advance care planning with patient; informed AVS has link to [employer] [employer].        6/30/2025   General Health   How would you rate your overall physical health? Good   Feel stress (tense, anxious, or unable to sleep) Only a little   (!) STRESS CONCERN      6/30/2025   Nutrition   Three or more servings of calcium each day? (!) NO   Diet: Regular (no restrictions)   How many servings of fruit and vegetables per day? (!) 0-1   How many sweetened beverages each day? (!) 2         6/30/2025   Exercise   Days per week of moderate/strenous exercise 2 days   Average minutes spent exercising at this level 20 min   (!) EXERCISE CONCERN      6/30/2025   Social Factors   Frequency of gathering with friends or relatives Twice a week   Worry food won't last until get money to buy more No   Food not last or not have enough money for food? No   Do you have housing? (Housing is defined as stable permanent housing and does not include staying outside in a car, in a tent, in an abandoned building, in an overnight shelter, or couch-surfing.) Yes   Are you worried about losing your housing? No   Lack of transportation? No   Unable to get utilities (heat,electricity)? No         6/30/2025   Fall Risk   Fallen 2 or more times in the past year? No   Trouble with walking or balance? No          6/30/2025   Dental   Dentist two times every year? Yes         Today's PHQ-2 Score:        "7/3/2025     5:06 AM   PHQ-2 ( 1999 [employer])   Q1: Little interest or pleasure in doing things 0   Q2: Feeling down, depressed or hopeless 0   PHQ-2 Score 0    Q1: Little interest or pleasure in doing things Not at all   Q2: Feeling down, depressed or hopeless Not at all   PHQ-2 Score 0       Patient-reported           6/30/2025   Substance Use   Alcohol more than 3/day or more than 7/wk No   Do you use any other substances recreationally? (!) CANNABIS PRODUCTS     Social History     Tobacco Use    Smoking status: Former     Current packs/day: 1.00     Average packs/day: 1 pack/day for 35.5 years (35.5 ttl pk-yrs)     Types: Cigarettes     Start date: 1/1/1990    Smokeless tobacco: Never   Substance Use Topics    Alcohol use: Yes     Comment: 2-3 drinks per week    Drug use: No           6/30/2025   STI Screening   New sexual partner(s) since last STI/HIV test? No   Last PSA:   Prostate Specific Antigen Screen   Date Value Ref Range Status   07/22/2023 1.80 0.00 - 4.50 ng/mL Final     ASCVD Risk   The ASCVD Risk score (Dillan SIMMONS, et al., 2019) failed to calculate for the following reasons:    The valid total cholesterol range is 130 to 320 mg/dL           Reviewed and updated as needed this visit by Provider     Meds  Problems                        Objective    Exam  BP (!) 140/64   Pulse 50   Temp 97.4  F (36.3  C) (Temporal)   Resp 16   Ht 1.702 m (5' 7\")   Wt 75.6 kg (166 lb 9.6 oz)   SpO2 95%   BMI 26.09 kg/m     Estimated body mass index is 26.09 kg/m  as calculated from the following:    Height as of this encounter: 1.702 m (5' 7\").    Weight as of this encounter: 75.6 kg (166 lb 9.6 oz).    Physical Exam  GENERAL: alert and no distress  EYES: Eyes grossly normal to inspection, PERRL and conjunctivae and sclerae normal  HENT: normal cephalic/atraumatic, ear canals and TM's normal, nose and mouth without ulcers or lesions, oropharynx clear, and oral mucous membranes moist  NECK: no adenopathy, " no asymmetry, masses, or scars  RESP: lungs clear to auscultation - no rales, rhonchi or wheezes  CV: regular rate and rhythm, normal S1 S2, no S3 or S4, no murmur, click or rub, no peripheral edema  ABDOMEN: soft, nontender, no hepatosplenomegaly, no masses and bowel sounds normal  MS: no gross musculoskeletal defects noted, no edema  SKIN: no suspicious lesions or rashes  NEURO: Normal strength and tone, mentation intact and speech normal  PSYCH: mentation appears normal, affect normal/bright  LYMPH: no cervical adenopathy        Signed Electronically by: Kumar Abreu MD

## 2025-07-21 ENCOUNTER — RESULTS FOLLOW-UP (OUTPATIENT)
Dept: INTERNAL MEDICINE | Facility: CLINIC | Age: 65
End: 2025-07-21
Payer: COMMERCIAL

## 2025-07-21 DIAGNOSIS — R73.9 HYPERGLYCEMIA: Primary | ICD-10-CM

## 2025-08-08 ENCOUNTER — TRANSFERRED RECORDS (OUTPATIENT)
Dept: HEALTH INFORMATION MANAGEMENT | Facility: CLINIC | Age: 65
End: 2025-08-08
Payer: COMMERCIAL

## 2025-08-27 ENCOUNTER — TRANSFERRED RECORDS (OUTPATIENT)
Dept: ADMINISTRATIVE | Facility: CLINIC | Age: 65
End: 2025-08-27
Payer: COMMERCIAL

## 2025-09-03 ENCOUNTER — PATIENT OUTREACH (OUTPATIENT)
Dept: CARE COORDINATION | Facility: CLINIC | Age: 65
End: 2025-09-03
Payer: COMMERCIAL

## 2025-09-04 ENCOUNTER — VIRTUAL VISIT (OUTPATIENT)
Dept: CARDIOLOGY | Facility: CLINIC | Age: 65
End: 2025-09-04
Attending: NURSE PRACTITIONER
Payer: COMMERCIAL

## 2025-09-04 DIAGNOSIS — E78.5 HYPERLIPIDEMIA LDL GOAL <70: ICD-10-CM

## 2025-09-04 DIAGNOSIS — M06.4 INFLAMMATORY POLYARTHROPATHY (H): ICD-10-CM

## 2025-09-04 DIAGNOSIS — Q25.43 AORTIC ROOT ANEURYSM: ICD-10-CM

## 2025-09-04 DIAGNOSIS — I10 BENIGN ESSENTIAL HYPERTENSION: Primary | ICD-10-CM

## 2025-09-04 RX ORDER — ADALIMUMAB-RYVK 40MG/0.4ML
KIT SUBCUTANEOUS
COMMUNITY
Start: 2025-05-01